# Patient Record
Sex: MALE | Race: WHITE | Employment: UNEMPLOYED | URBAN - METROPOLITAN AREA
[De-identification: names, ages, dates, MRNs, and addresses within clinical notes are randomized per-mention and may not be internally consistent; named-entity substitution may affect disease eponyms.]

---

## 2017-01-01 ENCOUNTER — TELEPHONE (OUTPATIENT)
Dept: FAMILY MEDICINE CLINIC | Age: 77
End: 2017-01-01

## 2017-01-01 ENCOUNTER — HOSPITAL ENCOUNTER (OUTPATIENT)
Dept: CT IMAGING | Facility: MEDICAL CENTER | Age: 77
Discharge: HOME | End: 2017-12-08
Attending: INTERNAL MEDICINE | Admitting: INTERNAL MEDICINE

## 2017-01-01 ENCOUNTER — OFFICE VISIT (OUTPATIENT)
Dept: PULMONOLOGY | Age: 77
End: 2017-01-01

## 2017-01-01 VITALS
DIASTOLIC BLOOD PRESSURE: 72 MMHG | HEIGHT: 69 IN | WEIGHT: 163.6 LBS | HEART RATE: 80 BPM | SYSTOLIC BLOOD PRESSURE: 116 MMHG | TEMPERATURE: 97 F | BODY MASS INDEX: 24.23 KG/M2 | RESPIRATION RATE: 20 BRPM | OXYGEN SATURATION: 93 %

## 2017-01-01 DIAGNOSIS — R91.1 SOLITARY PULMONARY NODULE: ICD-10-CM

## 2017-01-01 DIAGNOSIS — J98.4 CAVITATING MASS OF UPPER LOBE OF LEFT LUNG: Primary | ICD-10-CM

## 2017-01-01 DIAGNOSIS — R91.1 PULMONARY NODULE: ICD-10-CM

## 2017-01-01 DIAGNOSIS — R05.3 CHRONIC COUGH: ICD-10-CM

## 2017-01-01 PROCEDURE — 1123F ACP DISCUSS/DSCN MKR DOCD: CPT | Performed by: INTERNAL MEDICINE

## 2017-01-01 PROCEDURE — G8427 DOCREV CUR MEDS BY ELIG CLIN: HCPCS | Performed by: INTERNAL MEDICINE

## 2017-01-01 PROCEDURE — 1111F DSCHRG MED/CURRENT MED MERGE: CPT | Performed by: INTERNAL MEDICINE

## 2017-01-01 PROCEDURE — 99214 OFFICE O/P EST MOD 30 MIN: CPT | Performed by: INTERNAL MEDICINE

## 2017-01-01 PROCEDURE — G8420 CALC BMI NORM PARAMETERS: HCPCS | Performed by: INTERNAL MEDICINE

## 2017-01-01 PROCEDURE — 4040F PNEUMOC VAC/ADMIN/RCVD: CPT | Performed by: INTERNAL MEDICINE

## 2017-01-01 PROCEDURE — G8484 FLU IMMUNIZE NO ADMIN: HCPCS | Performed by: INTERNAL MEDICINE

## 2017-01-01 PROCEDURE — 4004F PT TOBACCO SCREEN RCVD TLK: CPT | Performed by: INTERNAL MEDICINE

## 2017-01-01 RX ORDER — MIRTAZAPINE 7.5 MG/1
7.5 TABLET, FILM COATED ORAL NIGHTLY
Qty: 30 TABLET | Refills: 3 | Status: SHIPPED | OUTPATIENT
Start: 2017-01-01 | End: 2018-01-01 | Stop reason: SDUPTHER

## 2017-01-01 RX ORDER — GLIMEPIRIDE 2 MG/1
TABLET ORAL
Qty: 30 TABLET | Refills: 1 | Status: SHIPPED | OUTPATIENT
Start: 2017-01-01 | End: 2018-01-01 | Stop reason: SDUPTHER

## 2017-02-24 ENCOUNTER — HOSPITAL ENCOUNTER (OUTPATIENT)
Dept: CT IMAGING | Age: 77
Discharge: HOME | End: 2017-02-24
Attending: INTERNAL MEDICINE | Admitting: INTERNAL MEDICINE

## 2017-02-24 DIAGNOSIS — C34.11 MALIGNANT NEOPLASM OF UPPER LOBE OF RIGHT LUNG (HCC): ICD-10-CM

## 2017-02-24 DIAGNOSIS — C34.11 MALIGNANT NEOPLASM OF UPPER LOBE, RIGHT BRONCHUS OR LUNG (HCC): ICD-10-CM

## 2017-02-24 LAB
A/G RATIO: 1.7 (ref 1.1–2.2)
ALBUMIN SERPL-MCNC: 4.4 G/DL (ref 3.4–5)
ALP BLD-CCNC: 83 U/L (ref 40–129)
ALT SERPL-CCNC: 19 U/L (ref 10–40)
ANION GAP SERPL CALCULATED.3IONS-SCNC: 6 MMOL/L (ref 3–16)
AST SERPL-CCNC: 21 U/L (ref 15–37)
BILIRUB SERPL-MCNC: <0.2 MG/DL (ref 0–1)
BUN BLDV-MCNC: 21 MG/DL (ref 7–20)
CALCIUM SERPL-MCNC: 10.4 MG/DL (ref 8.3–10.6)
CHLORIDE BLD-SCNC: 103 MMOL/L (ref 99–110)
CO2: 32 MMOL/L (ref 21–32)
CREAT SERPL-MCNC: 0.8 MG/DL (ref 0.8–1.3)
GFR AFRICAN AMERICAN: >60
GFR NON-AFRICAN AMERICAN: >60
GLOBULIN: 2.6 G/DL
GLUCOSE BLD-MCNC: 100 MG/DL (ref 70–99)
POTASSIUM SERPL-SCNC: 5.1 MMOL/L (ref 3.5–5.1)
SODIUM BLD-SCNC: 141 MMOL/L (ref 136–145)
TOTAL PROTEIN: 7 G/DL (ref 6.4–8.2)

## 2017-03-24 ENCOUNTER — TELEPHONE (OUTPATIENT)
Dept: FAMILY MEDICINE CLINIC | Age: 77
End: 2017-03-24

## 2017-03-27 RX ORDER — RANITIDINE 150 MG/1
TABLET ORAL
Qty: 60 TABLET | Refills: 5 | Status: SHIPPED | OUTPATIENT
Start: 2017-03-27 | End: 2017-05-31

## 2017-05-12 ENCOUNTER — OFFICE VISIT (OUTPATIENT)
Dept: FAMILY MEDICINE CLINIC | Age: 77
End: 2017-05-12

## 2017-05-12 ENCOUNTER — TELEPHONE (OUTPATIENT)
Dept: FAMILY MEDICINE CLINIC | Age: 77
End: 2017-05-12

## 2017-05-12 VITALS
BODY MASS INDEX: 24.96 KG/M2 | WEIGHT: 169 LBS | SYSTOLIC BLOOD PRESSURE: 120 MMHG | DIASTOLIC BLOOD PRESSURE: 70 MMHG | HEART RATE: 89 BPM | TEMPERATURE: 97.7 F | OXYGEN SATURATION: 96 %

## 2017-05-12 DIAGNOSIS — C34.00 MALIGNANT NEOPLASM OF HILUS OF LUNG, UNSPECIFIED LATERALITY (HCC): ICD-10-CM

## 2017-05-12 DIAGNOSIS — I10 ESSENTIAL HYPERTENSION: ICD-10-CM

## 2017-05-12 DIAGNOSIS — R53.83 FATIGUE, UNSPECIFIED TYPE: ICD-10-CM

## 2017-05-12 DIAGNOSIS — J43.1 PANLOBULAR EMPHYSEMA (HCC): Primary | ICD-10-CM

## 2017-05-12 LAB
BASOPHILS ABSOLUTE: 0 K/UL (ref 0–0.2)
BASOPHILS RELATIVE PERCENT: 0.9 %
EOSINOPHILS ABSOLUTE: 0.2 K/UL (ref 0–0.6)
EOSINOPHILS RELATIVE PERCENT: 3.8 %
HCT VFR BLD CALC: 46.2 % (ref 40.5–52.5)
HEMOGLOBIN: 15.1 G/DL (ref 13.5–17.5)
LYMPHOCYTES ABSOLUTE: 1 K/UL (ref 1–5.1)
LYMPHOCYTES RELATIVE PERCENT: 19.1 %
MCH RBC QN AUTO: 32.2 PG (ref 26–34)
MCHC RBC AUTO-ENTMCNC: 32.7 G/DL (ref 31–36)
MCV RBC AUTO: 98.6 FL (ref 80–100)
MONOCYTES ABSOLUTE: 0.6 K/UL (ref 0–1.3)
MONOCYTES RELATIVE PERCENT: 11.7 %
NEUTROPHILS ABSOLUTE: 3.4 K/UL (ref 1.7–7.7)
NEUTROPHILS RELATIVE PERCENT: 64.5 %
PDW BLD-RTO: 14.1 % (ref 12.4–15.4)
PLATELET # BLD: 161 K/UL (ref 135–450)
PMV BLD AUTO: 9.5 FL (ref 5–10.5)
RBC # BLD: 4.68 M/UL (ref 4.2–5.9)
WBC # BLD: 5.3 K/UL (ref 4–11)

## 2017-05-12 PROCEDURE — G8926 SPIRO NO PERF OR DOC: HCPCS | Performed by: FAMILY MEDICINE

## 2017-05-12 PROCEDURE — 4004F PT TOBACCO SCREEN RCVD TLK: CPT | Performed by: FAMILY MEDICINE

## 2017-05-12 PROCEDURE — 36415 COLL VENOUS BLD VENIPUNCTURE: CPT | Performed by: FAMILY MEDICINE

## 2017-05-12 PROCEDURE — G8420 CALC BMI NORM PARAMETERS: HCPCS | Performed by: FAMILY MEDICINE

## 2017-05-12 PROCEDURE — 94640 AIRWAY INHALATION TREATMENT: CPT | Performed by: FAMILY MEDICINE

## 2017-05-12 PROCEDURE — G8427 DOCREV CUR MEDS BY ELIG CLIN: HCPCS | Performed by: FAMILY MEDICINE

## 2017-05-12 PROCEDURE — 1123F ACP DISCUSS/DSCN MKR DOCD: CPT | Performed by: FAMILY MEDICINE

## 2017-05-12 PROCEDURE — 99214 OFFICE O/P EST MOD 30 MIN: CPT | Performed by: FAMILY MEDICINE

## 2017-05-12 PROCEDURE — 4040F PNEUMOC VAC/ADMIN/RCVD: CPT | Performed by: FAMILY MEDICINE

## 2017-05-12 PROCEDURE — 3023F SPIROM DOC REV: CPT | Performed by: FAMILY MEDICINE

## 2017-05-12 RX ORDER — PROMETHAZINE HYDROCHLORIDE AND CODEINE PHOSPHATE 6.25; 1 MG/5ML; MG/5ML
5 SYRUP ORAL NIGHTLY
Qty: 180 ML | Refills: 3 | Status: SHIPPED | OUTPATIENT
Start: 2017-05-12 | End: 2017-07-26

## 2017-05-12 RX ORDER — ALBUTEROL SULFATE 2.5 MG/3ML
2.5 SOLUTION RESPIRATORY (INHALATION) ONCE
Status: COMPLETED | OUTPATIENT
Start: 2017-05-12 | End: 2017-05-12

## 2017-05-12 RX ORDER — PREDNISONE 20 MG/1
40 TABLET ORAL DAILY
Qty: 20 TABLET | Refills: 0 | Status: SHIPPED | OUTPATIENT
Start: 2017-05-12 | End: 2017-05-22

## 2017-05-12 RX ADMIN — ALBUTEROL SULFATE 2.5 MG: 2.5 SOLUTION RESPIRATORY (INHALATION) at 14:47

## 2017-05-13 LAB
ALT SERPL-CCNC: 17 U/L (ref 10–40)
ANION GAP SERPL CALCULATED.3IONS-SCNC: 15 MMOL/L (ref 3–16)
BUN BLDV-MCNC: 24 MG/DL (ref 7–20)
CALCIUM SERPL-MCNC: 9.6 MG/DL (ref 8.3–10.6)
CHLORIDE BLD-SCNC: 100 MMOL/L (ref 99–110)
CO2: 25 MMOL/L (ref 21–32)
CREAT SERPL-MCNC: 0.7 MG/DL (ref 0.8–1.3)
GFR AFRICAN AMERICAN: >60
GFR NON-AFRICAN AMERICAN: >60
GLUCOSE BLD-MCNC: 100 MG/DL (ref 70–99)
POTASSIUM SERPL-SCNC: 4.9 MMOL/L (ref 3.5–5.1)
SODIUM BLD-SCNC: 140 MMOL/L (ref 136–145)
TSH REFLEX: 1.53 UIU/ML (ref 0.27–4.2)
VITAMIN B-12: 797 PG/ML (ref 211–911)

## 2017-05-19 ENCOUNTER — TELEPHONE (OUTPATIENT)
Dept: FAMILY MEDICINE CLINIC | Age: 77
End: 2017-05-19

## 2017-05-22 RX ORDER — DOXYCYCLINE HYCLATE 100 MG
100 TABLET ORAL 2 TIMES DAILY
Qty: 20 TABLET | Refills: 0 | Status: SHIPPED | OUTPATIENT
Start: 2017-05-22 | End: 2017-06-01

## 2017-05-22 RX ORDER — PREDNISONE 10 MG/1
TABLET ORAL
Qty: 56 TABLET | Refills: 0 | Status: SHIPPED | OUTPATIENT
Start: 2017-05-22 | End: 2017-06-01

## 2017-05-22 RX ORDER — AZITHROMYCIN 250 MG/1
TABLET, FILM COATED ORAL
Qty: 6 TABLET | Refills: 0 | Status: SHIPPED | OUTPATIENT
Start: 2017-05-22 | End: 2017-05-31

## 2017-05-27 ENCOUNTER — HOSPITAL ENCOUNTER (OUTPATIENT)
Dept: CT IMAGING | Age: 77
Discharge: HOME | End: 2017-05-27
Attending: INTERNAL MEDICINE | Admitting: INTERNAL MEDICINE

## 2017-05-27 DIAGNOSIS — Z08 ENCOUNTER FOR FOLLOW-UP EXAMINATION AFTER COMBINED TREATMENT FOR MALIGNANT NEOPLASM: ICD-10-CM

## 2017-05-27 DIAGNOSIS — Z85.118 PERSONAL HISTORY OF OTHER MALIGNANT NEOPLASM OF BRONCHUS AND LUNG: ICD-10-CM

## 2017-05-27 DIAGNOSIS — Z85.118 PERSONAL HISTORY OF LUNG CANCER: ICD-10-CM

## 2017-05-31 ENCOUNTER — TELEPHONE (OUTPATIENT)
Dept: PULMONOLOGY | Age: 77
End: 2017-05-31

## 2017-06-01 ENCOUNTER — OFFICE VISIT (OUTPATIENT)
Dept: PULMONOLOGY | Age: 77
End: 2017-06-01

## 2017-06-01 VITALS
RESPIRATION RATE: 18 BRPM | BODY MASS INDEX: 25.8 KG/M2 | HEIGHT: 70 IN | WEIGHT: 180.2 LBS | SYSTOLIC BLOOD PRESSURE: 124 MMHG | DIASTOLIC BLOOD PRESSURE: 70 MMHG | OXYGEN SATURATION: 97 % | HEART RATE: 104 BPM | TEMPERATURE: 98.1 F

## 2017-06-01 DIAGNOSIS — R05.3 CHRONIC COUGH: Primary | ICD-10-CM

## 2017-06-01 DIAGNOSIS — C34.91 ADENOCARCINOMA, LUNG, RIGHT (HCC): ICD-10-CM

## 2017-06-01 PROCEDURE — 1123F ACP DISCUSS/DSCN MKR DOCD: CPT | Performed by: INTERNAL MEDICINE

## 2017-06-01 PROCEDURE — 99214 OFFICE O/P EST MOD 30 MIN: CPT | Performed by: INTERNAL MEDICINE

## 2017-06-01 PROCEDURE — G8427 DOCREV CUR MEDS BY ELIG CLIN: HCPCS | Performed by: INTERNAL MEDICINE

## 2017-06-01 PROCEDURE — 4004F PT TOBACCO SCREEN RCVD TLK: CPT | Performed by: INTERNAL MEDICINE

## 2017-06-01 PROCEDURE — 4040F PNEUMOC VAC/ADMIN/RCVD: CPT | Performed by: INTERNAL MEDICINE

## 2017-06-01 PROCEDURE — G8420 CALC BMI NORM PARAMETERS: HCPCS | Performed by: INTERNAL MEDICINE

## 2017-06-01 RX ORDER — FLUTICASONE PROPIONATE 50 MCG
SPRAY, SUSPENSION (ML) NASAL
Qty: 1 BOTTLE | Refills: 3 | Status: SHIPPED | OUTPATIENT
Start: 2017-06-01 | End: 2017-07-11

## 2017-06-01 RX ORDER — OMEPRAZOLE 40 MG/1
40 CAPSULE, DELAYED RELEASE ORAL DAILY
Qty: 30 CAPSULE | Refills: 3 | Status: SHIPPED | OUTPATIENT
Start: 2017-06-01 | End: 2017-07-11

## 2017-06-01 RX ORDER — RANITIDINE 150 MG/1
150 CAPSULE ORAL 2 TIMES DAILY
COMMUNITY
End: 2017-07-26

## 2017-07-11 ENCOUNTER — OFFICE VISIT (OUTPATIENT)
Dept: PULMONOLOGY | Age: 77
End: 2017-07-11

## 2017-07-11 ENCOUNTER — TELEPHONE (OUTPATIENT)
Dept: PULMONOLOGY | Age: 77
End: 2017-07-11

## 2017-07-11 VITALS
TEMPERATURE: 97.6 F | SYSTOLIC BLOOD PRESSURE: 124 MMHG | WEIGHT: 175.8 LBS | RESPIRATION RATE: 20 BRPM | BODY MASS INDEX: 26.04 KG/M2 | DIASTOLIC BLOOD PRESSURE: 80 MMHG | HEART RATE: 101 BPM | OXYGEN SATURATION: 97 % | HEIGHT: 69 IN

## 2017-07-11 DIAGNOSIS — R05.3 CHRONIC COUGH: Primary | ICD-10-CM

## 2017-07-11 DIAGNOSIS — R93.89 ABNORMAL CT SCAN, CHEST: ICD-10-CM

## 2017-07-11 PROCEDURE — 99214 OFFICE O/P EST MOD 30 MIN: CPT | Performed by: INTERNAL MEDICINE

## 2017-07-11 PROCEDURE — 4004F PT TOBACCO SCREEN RCVD TLK: CPT | Performed by: INTERNAL MEDICINE

## 2017-07-11 PROCEDURE — 1123F ACP DISCUSS/DSCN MKR DOCD: CPT | Performed by: INTERNAL MEDICINE

## 2017-07-11 PROCEDURE — 4040F PNEUMOC VAC/ADMIN/RCVD: CPT | Performed by: INTERNAL MEDICINE

## 2017-07-11 PROCEDURE — G8427 DOCREV CUR MEDS BY ELIG CLIN: HCPCS | Performed by: INTERNAL MEDICINE

## 2017-07-11 PROCEDURE — G8419 CALC BMI OUT NRM PARAM NOF/U: HCPCS | Performed by: INTERNAL MEDICINE

## 2017-07-11 ASSESSMENT — SLEEP AND FATIGUE QUESTIONNAIRES
HOW LIKELY ARE YOU TO NOD OFF OR FALL ASLEEP WHILE SITTING AND READING: 2
HOW LIKELY ARE YOU TO NOD OFF OR FALL ASLEEP IN A CAR, WHILE STOPPED FOR A FEW MINUTES IN TRAFFIC: 0
HOW LIKELY ARE YOU TO NOD OFF OR FALL ASLEEP WHILE SITTING QUIETLY AFTER LUNCH WITHOUT ALCOHOL: 0
HOW LIKELY ARE YOU TO NOD OFF OR FALL ASLEEP WHILE SITTING INACTIVE IN A PUBLIC PLACE: 2
NECK CIRCUMFERENCE (INCHES): 15.5
HOW LIKELY ARE YOU TO NOD OFF OR FALL ASLEEP WHEN YOU ARE A PASSENGER IN A CAR FOR AN HOUR WITHOUT A BREAK: 2
HOW LIKELY ARE YOU TO NOD OFF OR FALL ASLEEP WHILE SITTING AND TALKING TO SOMEONE: 0
HOW LIKELY ARE YOU TO NOD OFF OR FALL ASLEEP WHILE LYING DOWN TO REST IN THE AFTERNOON WHEN CIRCUMSTANCES PERMIT: 2
HOW LIKELY ARE YOU TO NOD OFF OR FALL ASLEEP WHILE WATCHING TV: 2
ESS TOTAL SCORE: 10

## 2017-07-13 ENCOUNTER — HOSPITAL ENCOUNTER (OUTPATIENT)
Dept: OTHER | Age: 77
Discharge: HOME | End: 2017-07-13
Attending: INTERNAL MEDICINE | Admitting: INTERNAL MEDICINE

## 2017-07-13 VITALS
DIASTOLIC BLOOD PRESSURE: 73 MMHG | BODY MASS INDEX: 25.18 KG/M2 | RESPIRATION RATE: 16 BRPM | SYSTOLIC BLOOD PRESSURE: 107 MMHG | WEIGHT: 170 LBS | HEART RATE: 84 BPM | OXYGEN SATURATION: 94 % | HEIGHT: 69 IN | TEMPERATURE: 97.6 F

## 2017-07-13 LAB
APPEARANCE BAL (LAVAGE): CLEAR
CLOT EVALUATION BAL: ABNORMAL
COLOR LAVAGE: COLORLESS
EOSIN: 2 %
LYMPHOCYTES, BAL: 8 % (ref 5–10)
MACROPHAGES, BAL: 2 % (ref 90–95)
NUMBER OF CELLS COUNTED BAL (LAVAGE): 100
RBC, BAL: 1110 /CUMM
SEGMENTED NEUTROPHILS, BAL: 88 % (ref 5–10)
WBC/EPI CELLS BAL: 232 /CUMM

## 2017-07-13 PROCEDURE — 31624 DX BRONCHOSCOPE/LAVAGE: CPT | Performed by: INTERNAL MEDICINE

## 2017-07-13 PROCEDURE — 31623 DX BRONCHOSCOPE/BRUSH: CPT | Performed by: INTERNAL MEDICINE

## 2017-07-13 RX ORDER — ALBUTEROL SULFATE 2.5 MG/3ML
2.5 SOLUTION RESPIRATORY (INHALATION) EVERY 6 HOURS PRN
Status: COMPLETED | OUTPATIENT
Start: 2017-07-13 | End: 2017-07-13

## 2017-07-13 RX ADMIN — ALBUTEROL SULFATE 2.5 MG: 2.5 SOLUTION RESPIRATORY (INHALATION) at 11:25

## 2017-07-13 ASSESSMENT — PAIN SCALES - GENERAL
PAINLEVEL_OUTOF10: 0

## 2017-07-13 ASSESSMENT — PAIN - FUNCTIONAL ASSESSMENT: PAIN_FUNCTIONAL_ASSESSMENT: 0-10

## 2017-07-15 LAB
CULTURE, RESPIRATORY: NORMAL
CULTURE, RESPIRATORY: NORMAL
GRAM STAIN RESULT: NORMAL
GRAM STAIN RESULT: NORMAL

## 2017-07-17 RX ORDER — CEFDINIR 300 MG/1
300 CAPSULE ORAL 2 TIMES DAILY
Qty: 20 CAPSULE | Refills: 0 | Status: SHIPPED | OUTPATIENT
Start: 2017-07-17 | End: 2017-07-26

## 2017-07-19 ENCOUNTER — OFFICE VISIT (OUTPATIENT)
Dept: PULMONOLOGY | Age: 77
End: 2017-07-19

## 2017-07-19 VITALS
WEIGHT: 177.8 LBS | SYSTOLIC BLOOD PRESSURE: 133 MMHG | TEMPERATURE: 97.6 F | HEART RATE: 85 BPM | RESPIRATION RATE: 16 BRPM | OXYGEN SATURATION: 95 % | DIASTOLIC BLOOD PRESSURE: 83 MMHG | BODY MASS INDEX: 26.33 KG/M2 | HEIGHT: 69 IN

## 2017-07-19 DIAGNOSIS — R05.3 CHRONIC COUGH: Primary | ICD-10-CM

## 2017-07-19 DIAGNOSIS — C34.91 NON-SMALL CELL CANCER OF RIGHT LUNG (HCC): ICD-10-CM

## 2017-07-19 PROCEDURE — 4040F PNEUMOC VAC/ADMIN/RCVD: CPT | Performed by: INTERNAL MEDICINE

## 2017-07-19 PROCEDURE — G8427 DOCREV CUR MEDS BY ELIG CLIN: HCPCS | Performed by: INTERNAL MEDICINE

## 2017-07-19 PROCEDURE — G8419 CALC BMI OUT NRM PARAM NOF/U: HCPCS | Performed by: INTERNAL MEDICINE

## 2017-07-19 PROCEDURE — 1123F ACP DISCUSS/DSCN MKR DOCD: CPT | Performed by: INTERNAL MEDICINE

## 2017-07-19 PROCEDURE — 99214 OFFICE O/P EST MOD 30 MIN: CPT | Performed by: INTERNAL MEDICINE

## 2017-07-19 PROCEDURE — 4004F PT TOBACCO SCREEN RCVD TLK: CPT | Performed by: INTERNAL MEDICINE

## 2017-07-19 RX ORDER — BENZONATATE 100 MG/1
100 CAPSULE ORAL 3 TIMES DAILY PRN
Qty: 90 CAPSULE | Refills: 1 | Status: SHIPPED | OUTPATIENT
Start: 2017-07-19 | End: 2017-07-26

## 2017-07-26 ENCOUNTER — OFFICE VISIT (OUTPATIENT)
Dept: FAMILY MEDICINE CLINIC | Age: 77
End: 2017-07-26

## 2017-07-26 VITALS
OXYGEN SATURATION: 98 % | WEIGHT: 176.4 LBS | HEART RATE: 77 BPM | TEMPERATURE: 97.8 F | BODY MASS INDEX: 26.05 KG/M2 | DIASTOLIC BLOOD PRESSURE: 78 MMHG | SYSTOLIC BLOOD PRESSURE: 120 MMHG

## 2017-07-26 DIAGNOSIS — F32.0 MAJOR DEPRESSIVE DISORDER, SINGLE EPISODE, MILD (HCC): ICD-10-CM

## 2017-07-26 DIAGNOSIS — R31.9 HEMATURIA: ICD-10-CM

## 2017-07-26 DIAGNOSIS — R41.82 ALTERED MENTAL STATUS, UNSPECIFIED ALTERED MENTAL STATUS TYPE: ICD-10-CM

## 2017-07-26 DIAGNOSIS — F03.90 DEMENTIA WITHOUT BEHAVIORAL DISTURBANCE, UNSPECIFIED DEMENTIA TYPE: ICD-10-CM

## 2017-07-26 DIAGNOSIS — R53.83 FATIGUE, UNSPECIFIED TYPE: ICD-10-CM

## 2017-07-26 DIAGNOSIS — C34.00 MALIGNANT NEOPLASM OF HILUS OF LUNG, UNSPECIFIED LATERALITY (HCC): Primary | ICD-10-CM

## 2017-07-26 DIAGNOSIS — R05.3 CHRONIC COUGH: ICD-10-CM

## 2017-07-26 LAB
ALT SERPL-CCNC: 25 U/L (ref 10–40)
ANION GAP SERPL CALCULATED.3IONS-SCNC: 14 MMOL/L (ref 3–16)
AST SERPL-CCNC: 21 U/L (ref 15–37)
BASOPHILS ABSOLUTE: 0 K/UL (ref 0–0.2)
BASOPHILS RELATIVE PERCENT: 1.1 %
BILIRUBIN, POC: ABNORMAL
BLOOD URINE, POC: ABNORMAL
BUN BLDV-MCNC: 20 MG/DL (ref 7–20)
CALCIUM SERPL-MCNC: 9.3 MG/DL (ref 8.3–10.6)
CHLORIDE BLD-SCNC: 100 MMOL/L (ref 99–110)
CLARITY, POC: CLEAR
CO2: 25 MMOL/L (ref 21–32)
COLOR, POC: ABNORMAL
CREAT SERPL-MCNC: 0.8 MG/DL (ref 0.8–1.3)
EOSINOPHILS ABSOLUTE: 0.2 K/UL (ref 0–0.6)
EOSINOPHILS RELATIVE PERCENT: 5.4 %
FOLATE: >20 NG/ML (ref 4.78–24.2)
GFR AFRICAN AMERICAN: >60
GFR NON-AFRICAN AMERICAN: >60
GLUCOSE BLD-MCNC: 88 MG/DL (ref 70–99)
GLUCOSE URINE, POC: ABNORMAL
HCT VFR BLD CALC: 45.8 % (ref 40.5–52.5)
HEMOGLOBIN: 15.6 G/DL (ref 13.5–17.5)
KETONES, POC: ABNORMAL
LEUKOCYTE EST, POC: ABNORMAL
LYMPHOCYTES ABSOLUTE: 0.8 K/UL (ref 1–5.1)
LYMPHOCYTES RELATIVE PERCENT: 16.9 %
MCH RBC QN AUTO: 33.6 PG (ref 26–34)
MCHC RBC AUTO-ENTMCNC: 34 G/DL (ref 31–36)
MCV RBC AUTO: 98.8 FL (ref 80–100)
MONOCYTES ABSOLUTE: 0.5 K/UL (ref 0–1.3)
MONOCYTES RELATIVE PERCENT: 10.6 %
NEUTROPHILS ABSOLUTE: 3 K/UL (ref 1.7–7.7)
NEUTROPHILS RELATIVE PERCENT: 66 %
NITRITE, POC: ABNORMAL
PDW BLD-RTO: 13.8 % (ref 12.4–15.4)
PH, POC: 5
PLATELET # BLD: 159 K/UL (ref 135–450)
PMV BLD AUTO: 9.3 FL (ref 5–10.5)
POTASSIUM SERPL-SCNC: 4.5 MMOL/L (ref 3.5–5.1)
PROTEIN, POC: ABNORMAL
RBC # BLD: 4.64 M/UL (ref 4.2–5.9)
SODIUM BLD-SCNC: 139 MMOL/L (ref 136–145)
SPECIFIC GRAVITY, POC: 1.02
TSH REFLEX: 1.21 UIU/ML (ref 0.27–4.2)
UROBILINOGEN, POC: 0.2
VITAMIN B-12: 769 PG/ML (ref 211–911)
WBC # BLD: 4.5 K/UL (ref 4–11)

## 2017-07-26 PROCEDURE — 36415 COLL VENOUS BLD VENIPUNCTURE: CPT | Performed by: FAMILY MEDICINE

## 2017-07-26 PROCEDURE — 81003 URINALYSIS AUTO W/O SCOPE: CPT | Performed by: FAMILY MEDICINE

## 2017-07-26 PROCEDURE — G8419 CALC BMI OUT NRM PARAM NOF/U: HCPCS | Performed by: FAMILY MEDICINE

## 2017-07-26 PROCEDURE — 1123F ACP DISCUSS/DSCN MKR DOCD: CPT | Performed by: FAMILY MEDICINE

## 2017-07-26 PROCEDURE — 4040F PNEUMOC VAC/ADMIN/RCVD: CPT | Performed by: FAMILY MEDICINE

## 2017-07-26 PROCEDURE — G8427 DOCREV CUR MEDS BY ELIG CLIN: HCPCS | Performed by: FAMILY MEDICINE

## 2017-07-26 PROCEDURE — 4004F PT TOBACCO SCREEN RCVD TLK: CPT | Performed by: FAMILY MEDICINE

## 2017-07-26 PROCEDURE — 99214 OFFICE O/P EST MOD 30 MIN: CPT | Performed by: FAMILY MEDICINE

## 2017-07-27 ENCOUNTER — TELEPHONE (OUTPATIENT)
Dept: FAMILY MEDICINE CLINIC | Age: 77
End: 2017-07-27

## 2017-07-27 RX ORDER — PAROXETINE HYDROCHLORIDE 20 MG/1
20 TABLET, FILM COATED ORAL EVERY MORNING
Qty: 30 TABLET | Refills: 3 | Status: SHIPPED | OUTPATIENT
Start: 2017-07-27 | End: 2017-08-02 | Stop reason: ALTCHOICE

## 2017-07-28 LAB — URINE CULTURE, ROUTINE: NORMAL

## 2017-07-31 ENCOUNTER — HOSPITAL ENCOUNTER (OUTPATIENT)
Dept: CT IMAGING | Age: 77
Discharge: HOME | End: 2017-07-31
Attending: FAMILY MEDICINE | Admitting: FAMILY MEDICINE

## 2017-07-31 DIAGNOSIS — F03.90 DEMENTIA WITHOUT BEHAVIORAL DISTURBANCE, UNSPECIFIED DEMENTIA TYPE: ICD-10-CM

## 2017-07-31 DIAGNOSIS — R53.83 FATIGUE, UNSPECIFIED TYPE: ICD-10-CM

## 2017-07-31 DIAGNOSIS — C34.00 MALIGNANT NEOPLASM OF HILUS OF LUNG, UNSPECIFIED LATERALITY (HCC): ICD-10-CM

## 2017-07-31 DIAGNOSIS — R05.3 CHRONIC COUGH: ICD-10-CM

## 2017-07-31 DIAGNOSIS — C34.00 MALIGNANT NEOPLASM OF MAIN BRONCHUS (HCC): ICD-10-CM

## 2017-07-31 DIAGNOSIS — R41.82 ALTERED MENTAL STATUS, UNSPECIFIED ALTERED MENTAL STATUS TYPE: ICD-10-CM

## 2017-07-31 DIAGNOSIS — F32.0 MAJOR DEPRESSIVE DISORDER, SINGLE EPISODE, MILD (HCC): ICD-10-CM

## 2017-07-31 RX ORDER — DEXAMETHASONE 4 MG/1
4 TABLET ORAL EVERY 6 HOURS
Qty: 40 TABLET | Refills: 0 | Status: SHIPPED | OUTPATIENT
Start: 2017-07-31 | End: 2017-08-08 | Stop reason: DRUGHIGH

## 2017-08-01 ENCOUNTER — HOSPITAL ENCOUNTER (OUTPATIENT)
Dept: MRI IMAGING | Age: 77
Discharge: HOME | End: 2017-08-01
Attending: RADIOLOGY | Admitting: RADIOLOGY

## 2017-08-01 DIAGNOSIS — C79.31 SECONDARY CANCER OF BRAIN (HCC): ICD-10-CM

## 2017-08-01 DIAGNOSIS — C79.31 SECONDARY MALIGNANT NEOPLASM OF BRAIN (HCC): ICD-10-CM

## 2017-08-03 ENCOUNTER — OFFICE VISIT (OUTPATIENT)
Dept: FAMILY MEDICINE CLINIC | Age: 77
End: 2017-08-03

## 2017-08-03 ENCOUNTER — TELEPHONE (OUTPATIENT)
Dept: RADIATION ONCOLOGY | Age: 77
End: 2017-08-03

## 2017-08-03 VITALS
OXYGEN SATURATION: 97 % | DIASTOLIC BLOOD PRESSURE: 68 MMHG | WEIGHT: 180 LBS | BODY MASS INDEX: 26.58 KG/M2 | TEMPERATURE: 97.5 F | SYSTOLIC BLOOD PRESSURE: 110 MMHG | HEART RATE: 72 BPM

## 2017-08-03 DIAGNOSIS — C79.31 SECONDARY ADENOCARCINOMA OF BRAIN (HCC): ICD-10-CM

## 2017-08-03 DIAGNOSIS — F41.9 ANXIETY: ICD-10-CM

## 2017-08-03 DIAGNOSIS — I10 ESSENTIAL HYPERTENSION: ICD-10-CM

## 2017-08-03 DIAGNOSIS — G40.909 SECONDARY SEIZURE DISORDER (HCC): ICD-10-CM

## 2017-08-03 DIAGNOSIS — C34.00 MALIGNANT NEOPLASM OF HILUS OF LUNG, UNSPECIFIED LATERALITY (HCC): Primary | ICD-10-CM

## 2017-08-03 PROCEDURE — G8427 DOCREV CUR MEDS BY ELIG CLIN: HCPCS | Performed by: FAMILY MEDICINE

## 2017-08-03 PROCEDURE — 99214 OFFICE O/P EST MOD 30 MIN: CPT | Performed by: FAMILY MEDICINE

## 2017-08-03 PROCEDURE — 4040F PNEUMOC VAC/ADMIN/RCVD: CPT | Performed by: FAMILY MEDICINE

## 2017-08-03 PROCEDURE — G8419 CALC BMI OUT NRM PARAM NOF/U: HCPCS | Performed by: FAMILY MEDICINE

## 2017-08-03 PROCEDURE — 4004F PT TOBACCO SCREEN RCVD TLK: CPT | Performed by: FAMILY MEDICINE

## 2017-08-03 PROCEDURE — 1123F ACP DISCUSS/DSCN MKR DOCD: CPT | Performed by: FAMILY MEDICINE

## 2017-08-03 RX ORDER — CITALOPRAM 10 MG/1
10 TABLET ORAL DAILY
Qty: 30 TABLET | Refills: 3 | Status: SHIPPED | OUTPATIENT
Start: 2017-08-03 | End: 2017-09-19 | Stop reason: SDUPTHER

## 2017-08-04 ENCOUNTER — TELEPHONE (OUTPATIENT)
Dept: RADIATION ONCOLOGY | Age: 77
End: 2017-08-04

## 2017-08-07 ENCOUNTER — TELEPHONE (OUTPATIENT)
Dept: RADIATION ONCOLOGY | Age: 77
End: 2017-08-07

## 2017-08-08 ENCOUNTER — HOSPITAL ENCOUNTER (OUTPATIENT)
Dept: RADIATION ONCOLOGY | Age: 77
Discharge: HOME | End: 2017-08-08
Attending: NEUROLOGICAL SURGERY | Admitting: NEUROLOGICAL SURGERY

## 2017-08-08 VITALS
TEMPERATURE: 98.5 F | DIASTOLIC BLOOD PRESSURE: 67 MMHG | WEIGHT: 170 LBS | RESPIRATION RATE: 16 BRPM | BODY MASS INDEX: 25.18 KG/M2 | OXYGEN SATURATION: 96 % | HEIGHT: 69 IN | SYSTOLIC BLOOD PRESSURE: 134 MMHG | HEART RATE: 51 BPM

## 2017-08-08 DIAGNOSIS — C79.31 SECONDARY MALIGNANT NEOPLASM OF BRAIN AND SPINAL CORD (HCC): ICD-10-CM

## 2017-08-08 DIAGNOSIS — C79.31 SECONDARY MALIGNANT NEOPLASM OF BRAIN (HCC): ICD-10-CM

## 2017-08-08 DIAGNOSIS — C79.49 SECONDARY MALIGNANT NEOPLASM OF BRAIN AND SPINAL CORD (HCC): ICD-10-CM

## 2017-08-08 RX ORDER — ONDANSETRON 2 MG/ML
4 INJECTION INTRAMUSCULAR; INTRAVENOUS ONCE
Status: COMPLETED | OUTPATIENT
Start: 2017-08-08 | End: 2017-08-08

## 2017-08-08 RX ORDER — 0.9 % SODIUM CHLORIDE 0.9 %
500 INTRAVENOUS SOLUTION INTRAVENOUS ONCE
Status: COMPLETED | OUTPATIENT
Start: 2017-08-08 | End: 2017-08-08

## 2017-08-08 RX ORDER — HEPARIN SODIUM (PORCINE) LOCK FLUSH IV SOLN 100 UNIT/ML 100 UNIT/ML
500 SOLUTION INTRAVENOUS PRN
Status: DISCONTINUED | OUTPATIENT
Start: 2017-08-08 | End: 2017-08-09 | Stop reason: HOSPADM

## 2017-08-08 RX ORDER — LIDOCAINE HYDROCHLORIDE 10 MG/ML
30 INJECTION, SOLUTION EPIDURAL; INFILTRATION; INTRACAUDAL; PERINEURAL ONCE
Status: COMPLETED | OUTPATIENT
Start: 2017-08-08 | End: 2017-08-08

## 2017-08-08 RX ORDER — BUPIVACAINE HYDROCHLORIDE 5 MG/ML
30 INJECTION, SOLUTION EPIDURAL; INTRACAUDAL ONCE
Status: COMPLETED | OUTPATIENT
Start: 2017-08-08 | End: 2017-08-08

## 2017-08-08 RX ORDER — DEXAMETHASONE SODIUM PHOSPHATE 10 MG/ML
10 INJECTION, SOLUTION INTRAMUSCULAR; INTRAVENOUS ONCE
Status: DISCONTINUED | OUTPATIENT
Start: 2017-08-08 | End: 2017-08-09 | Stop reason: HOSPADM

## 2017-08-08 RX ADMIN — Medication 500 ML: at 06:38

## 2017-08-08 RX ADMIN — ONDANSETRON 4 MG: 2 INJECTION INTRAMUSCULAR; INTRAVENOUS at 07:17

## 2017-08-08 RX ADMIN — BUPIVACAINE HYDROCHLORIDE 150 MG: 5 INJECTION, SOLUTION EPIDURAL; INTRACAUDAL at 07:55

## 2017-08-08 RX ADMIN — HEPARIN SODIUM (PORCINE) LOCK FLUSH IV SOLN 100 UNIT/ML 500 UNITS: 100 SOLUTION at 14:30

## 2017-08-08 RX ADMIN — LIDOCAINE HYDROCHLORIDE 30 ML: 10 INJECTION, SOLUTION EPIDURAL; INFILTRATION; INTRACAUDAL; PERINEURAL at 07:55

## 2017-08-08 ASSESSMENT — PAIN SCALES - GENERAL: PAINLEVEL_OUTOF10: 10

## 2017-08-09 ENCOUNTER — TELEPHONE (OUTPATIENT)
Dept: RADIATION ONCOLOGY | Age: 77
End: 2017-08-09

## 2017-08-11 DIAGNOSIS — C79.31 BRAIN METASTASES (HCC): Primary | ICD-10-CM

## 2017-08-14 LAB
FUNGUS (MYCOLOGY) CULTURE: NORMAL
FUNGUS STAIN: NORMAL

## 2017-08-22 ENCOUNTER — HOSPITAL ENCOUNTER (OUTPATIENT)
Dept: CT IMAGING | Age: 77
Discharge: HOME | End: 2017-08-22
Attending: RADIOLOGY | Admitting: RADIOLOGY

## 2017-08-22 ENCOUNTER — CLINICAL DOCUMENTATION (OUTPATIENT)
Dept: RADIATION ONCOLOGY | Age: 77
End: 2017-08-22

## 2017-08-22 DIAGNOSIS — C79.31 SECONDARY MALIGNANT NEOPLASM OF BRAIN (HCC): ICD-10-CM

## 2017-08-22 DIAGNOSIS — C79.31 BRAIN METASTASIS (HCC): ICD-10-CM

## 2017-08-24 ENCOUNTER — HOSPITAL ENCOUNTER (OUTPATIENT)
Dept: OTHER | Age: 77
Discharge: HOME | End: 2017-08-24
Attending: INTERNAL MEDICINE | Admitting: INTERNAL MEDICINE

## 2017-08-24 VITALS — WEIGHT: 170 LBS | BODY MASS INDEX: 25.18 KG/M2 | HEIGHT: 69 IN

## 2017-08-24 DIAGNOSIS — C34.31 MALIGNANT NEOPLASM OF LOWER LOBE OF RIGHT LUNG (HCC): ICD-10-CM

## 2017-08-24 RX ORDER — FLUDEOXYGLUCOSE F 18 200 MCI/ML
17.3 INJECTION, SOLUTION INTRAVENOUS
Status: COMPLETED | OUTPATIENT
Start: 2017-08-24 | End: 2017-08-24

## 2017-08-24 RX ADMIN — FLUDEOXYGLUCOSE F 18 17.3 MILLICURIE: 200 INJECTION, SOLUTION INTRAVENOUS at 10:35

## 2017-08-29 LAB
AFB CULTURE (MYCOBACTERIA): NORMAL
AFB CULTURE (MYCOBACTERIA): NORMAL
AFB SMEAR: NORMAL
AFB SMEAR: NORMAL

## 2017-08-30 LAB
FUNGUS (MYCOLOGY) CULTURE: ABNORMAL
FUNGUS STAIN: ABNORMAL
ORGANISM: ABNORMAL

## 2017-08-31 ENCOUNTER — TELEPHONE (OUTPATIENT)
Dept: FAMILY MEDICINE CLINIC | Age: 77
End: 2017-08-31

## 2017-08-31 DIAGNOSIS — R13.19 OTHER DYSPHAGIA: Primary | ICD-10-CM

## 2017-09-01 ENCOUNTER — TELEPHONE (OUTPATIENT)
Dept: RADIATION ONCOLOGY | Age: 77
End: 2017-09-01

## 2017-09-13 ENCOUNTER — OFFICE VISIT (OUTPATIENT)
Dept: FAMILY MEDICINE CLINIC | Age: 77
End: 2017-09-13

## 2017-09-13 DIAGNOSIS — C34.91 NON-SMALL CELL LUNG CANCER, RIGHT (HCC): ICD-10-CM

## 2017-09-13 DIAGNOSIS — R06.02 SHORTNESS OF BREATH: Primary | ICD-10-CM

## 2017-09-13 DIAGNOSIS — R00.0 TACHYCARDIA: ICD-10-CM

## 2017-09-13 DIAGNOSIS — R53.83 FATIGUE, UNSPECIFIED TYPE: ICD-10-CM

## 2017-09-13 DIAGNOSIS — R60.0 BILATERAL EDEMA OF LOWER EXTREMITY: ICD-10-CM

## 2017-09-13 DIAGNOSIS — C79.31 SECONDARY ADENOCARCINOMA OF BRAIN (HCC): ICD-10-CM

## 2017-09-13 DIAGNOSIS — R41.0 DELIRIUM: ICD-10-CM

## 2017-09-13 PROCEDURE — 4004F PT TOBACCO SCREEN RCVD TLK: CPT | Performed by: FAMILY MEDICINE

## 2017-09-13 PROCEDURE — 1123F ACP DISCUSS/DSCN MKR DOCD: CPT | Performed by: FAMILY MEDICINE

## 2017-09-13 PROCEDURE — G8417 CALC BMI ABV UP PARAM F/U: HCPCS | Performed by: FAMILY MEDICINE

## 2017-09-13 PROCEDURE — 4040F PNEUMOC VAC/ADMIN/RCVD: CPT | Performed by: FAMILY MEDICINE

## 2017-09-13 PROCEDURE — 99214 OFFICE O/P EST MOD 30 MIN: CPT | Performed by: FAMILY MEDICINE

## 2017-09-13 PROCEDURE — G8427 DOCREV CUR MEDS BY ELIG CLIN: HCPCS | Performed by: FAMILY MEDICINE

## 2017-09-13 RX ORDER — ALPRAZOLAM 0.5 MG/1
0.5 TABLET ORAL NIGHTLY PRN
Qty: 30 TABLET | Refills: 5 | Status: ON HOLD | OUTPATIENT
Start: 2017-09-13 | End: 2017-09-14 | Stop reason: ALTCHOICE

## 2017-09-14 PROBLEM — J32.0 MAXILLARY SINUSITIS, CHRONIC: Status: ACTIVE | Noted: 2017-09-14

## 2017-09-14 PROBLEM — E44.0 MODERATE PROTEIN-CALORIE MALNUTRITION (HCC): Status: ACTIVE | Noted: 2017-09-14

## 2017-09-14 PROBLEM — R06.02 SOB (SHORTNESS OF BREATH): Status: ACTIVE | Noted: 2017-09-14

## 2017-09-14 PROBLEM — J32.2 CHRONIC ETHMOIDAL SINUSITIS: Status: ACTIVE | Noted: 2017-09-14

## 2017-09-14 PROBLEM — F17.200 CURRENT SMOKER: Status: ACTIVE | Noted: 2017-09-14

## 2017-09-14 PROBLEM — I51.89 DIASTOLIC DYSFUNCTION: Status: ACTIVE | Noted: 2017-09-14

## 2017-09-19 ENCOUNTER — OFFICE VISIT (OUTPATIENT)
Dept: FAMILY MEDICINE CLINIC | Age: 77
End: 2017-09-19

## 2017-09-19 VITALS
OXYGEN SATURATION: 92 % | DIASTOLIC BLOOD PRESSURE: 78 MMHG | WEIGHT: 175.4 LBS | BODY MASS INDEX: 25.9 KG/M2 | SYSTOLIC BLOOD PRESSURE: 132 MMHG | HEART RATE: 110 BPM

## 2017-09-19 DIAGNOSIS — E46 PROTEIN DEFICIENCY (HCC): ICD-10-CM

## 2017-09-19 DIAGNOSIS — J01.90 ACUTE BACTERIAL SINUSITIS: ICD-10-CM

## 2017-09-19 DIAGNOSIS — R26.81 UNSTEADY GAIT: ICD-10-CM

## 2017-09-19 DIAGNOSIS — B96.89 ACUTE BACTERIAL SINUSITIS: ICD-10-CM

## 2017-09-19 DIAGNOSIS — R60.0 BILATERAL EDEMA OF LOWER EXTREMITY: Primary | ICD-10-CM

## 2017-09-19 DIAGNOSIS — J44.1 COPD EXACERBATION (HCC): ICD-10-CM

## 2017-09-19 PROCEDURE — 4004F PT TOBACCO SCREEN RCVD TLK: CPT | Performed by: FAMILY MEDICINE

## 2017-09-19 PROCEDURE — 4040F PNEUMOC VAC/ADMIN/RCVD: CPT | Performed by: FAMILY MEDICINE

## 2017-09-19 PROCEDURE — 1111F DSCHRG MED/CURRENT MED MERGE: CPT | Performed by: FAMILY MEDICINE

## 2017-09-19 PROCEDURE — 99214 OFFICE O/P EST MOD 30 MIN: CPT | Performed by: FAMILY MEDICINE

## 2017-09-19 PROCEDURE — 3023F SPIROM DOC REV: CPT | Performed by: FAMILY MEDICINE

## 2017-09-19 PROCEDURE — G8417 CALC BMI ABV UP PARAM F/U: HCPCS | Performed by: FAMILY MEDICINE

## 2017-09-19 PROCEDURE — G8926 SPIRO NO PERF OR DOC: HCPCS | Performed by: FAMILY MEDICINE

## 2017-09-19 PROCEDURE — G8427 DOCREV CUR MEDS BY ELIG CLIN: HCPCS | Performed by: FAMILY MEDICINE

## 2017-09-19 PROCEDURE — 1123F ACP DISCUSS/DSCN MKR DOCD: CPT | Performed by: FAMILY MEDICINE

## 2017-09-19 RX ORDER — CITALOPRAM 10 MG/1
10 TABLET ORAL DAILY
Qty: 30 TABLET | Refills: 3 | Status: SHIPPED | OUTPATIENT
Start: 2017-09-19 | End: 2017-11-05 | Stop reason: ALTCHOICE

## 2017-09-19 RX ORDER — AMOXICILLIN AND CLAVULANATE POTASSIUM 875; 125 MG/1; MG/1
1 TABLET, FILM COATED ORAL EVERY 12 HOURS
Qty: 20 TABLET | Refills: 0 | Status: SHIPPED | OUTPATIENT
Start: 2017-09-19 | End: 2017-09-29

## 2017-09-25 ENCOUNTER — TELEPHONE (OUTPATIENT)
Dept: FAMILY MEDICINE CLINIC | Age: 77
End: 2017-09-25

## 2017-09-25 DIAGNOSIS — R60.1 GENERALIZED EDEMA: Primary | ICD-10-CM

## 2017-09-25 DIAGNOSIS — R29.898 WEAKNESS OF LOWER EXTREMITY, UNSPECIFIED LATERALITY: ICD-10-CM

## 2017-10-05 ENCOUNTER — OFFICE VISIT (OUTPATIENT)
Dept: FAMILY MEDICINE CLINIC | Age: 77
End: 2017-10-05

## 2017-10-05 VITALS
TEMPERATURE: 97.4 F | HEART RATE: 87 BPM | SYSTOLIC BLOOD PRESSURE: 120 MMHG | OXYGEN SATURATION: 97 % | DIASTOLIC BLOOD PRESSURE: 80 MMHG | WEIGHT: 181.2 LBS | BODY MASS INDEX: 26.76 KG/M2

## 2017-10-05 DIAGNOSIS — C34.00 MALIGNANT NEOPLASM OF HILUS OF LUNG, UNSPECIFIED LATERALITY (HCC): Primary | ICD-10-CM

## 2017-10-05 DIAGNOSIS — C79.31 SECONDARY ADENOCARCINOMA OF BRAIN (HCC): ICD-10-CM

## 2017-10-05 DIAGNOSIS — J32.2 CHRONIC ETHMOIDAL SINUSITIS: ICD-10-CM

## 2017-10-05 DIAGNOSIS — I10 ESSENTIAL HYPERTENSION: ICD-10-CM

## 2017-10-05 DIAGNOSIS — E44.0 MODERATE PROTEIN-CALORIE MALNUTRITION (HCC): ICD-10-CM

## 2017-10-05 DIAGNOSIS — J32.0 MAXILLARY SINUSITIS, CHRONIC: ICD-10-CM

## 2017-10-05 PROCEDURE — 4004F PT TOBACCO SCREEN RCVD TLK: CPT | Performed by: FAMILY MEDICINE

## 2017-10-05 PROCEDURE — 1111F DSCHRG MED/CURRENT MED MERGE: CPT | Performed by: FAMILY MEDICINE

## 2017-10-05 PROCEDURE — G8427 DOCREV CUR MEDS BY ELIG CLIN: HCPCS | Performed by: FAMILY MEDICINE

## 2017-10-05 PROCEDURE — G8484 FLU IMMUNIZE NO ADMIN: HCPCS | Performed by: FAMILY MEDICINE

## 2017-10-05 PROCEDURE — 4040F PNEUMOC VAC/ADMIN/RCVD: CPT | Performed by: FAMILY MEDICINE

## 2017-10-05 PROCEDURE — G0008 ADMIN INFLUENZA VIRUS VAC: HCPCS | Performed by: FAMILY MEDICINE

## 2017-10-05 PROCEDURE — 1123F ACP DISCUSS/DSCN MKR DOCD: CPT | Performed by: FAMILY MEDICINE

## 2017-10-05 PROCEDURE — G8417 CALC BMI ABV UP PARAM F/U: HCPCS | Performed by: FAMILY MEDICINE

## 2017-10-05 PROCEDURE — 99214 OFFICE O/P EST MOD 30 MIN: CPT | Performed by: FAMILY MEDICINE

## 2017-10-05 PROCEDURE — 90688 IIV4 VACCINE SPLT 0.5 ML IM: CPT | Performed by: FAMILY MEDICINE

## 2017-10-05 NOTE — MR AVS SNAPSHOT
Your BMI as listed above is considered overweight (25.0-29.9). BMI is an estimate of body fat, calculated from your height and weight. The higher your BMI, the greater your risk of heart disease, high blood pressure, type 2 diabetes, stroke, gallstones, arthritis, sleep apnea, and certain cancers. BMI is not perfect. It may overestimate body fat in athletes and people who are more muscular. If your body fat is high you can improve your BMI by decreasing your calorie intake and becoming more physically active. Learn more at: [email or web address]          Instructions     Please read the healthy family handout that you were given and share it with your family. Please compare this printed medication list with your medications at home to be sure they are the same. If you have any medications that are different please contact us immediately at [phone]. Also review your allergies that we have listed, these may also include medications that you have not been able to tolerate, make sure everything listed is correct. If you have any allergies that are different please contact us immediately at [phone]. Medications and Orders      Your Current Medications Are              dexamethasone (DECADRON) 4 MG tablet Take 1 tablet by mouth 4 times daily (after meals and at bedtime) for 10 days    HYDROcodone-acetaminophen (NORCO) 5-325 MG per tablet Take 1 tablet by mouth every 6 hours as needed for Pain .     citalopram (CELEXA) 10 MG tablet Take 1 tablet by mouth daily    omeprazole (PRILOSEC) 40 MG delayed release capsule Take 1 capsule by mouth daily    levETIRAcetam (KEPPRA) 500 MG tablet Take 500 mg by mouth 2 times daily    Compression Stockings MISC by Does not apply route DX: Edema R60.1 lower extremity weakness R29.898    CARAFATE 1 GM/10ML suspension Take 10 mLs by mouth every 6 hours as needed Miles Mixture with lidocaine 1-2 tsp s/swallow q4-6h don't eat x 15 min after.  Keflex 2 g/Nystatin 4 mil units/Hydrocortisone 40 mg/QS Lidocaine 2% w/ flavor (pt pref) 80mL/QS Water      Allergies              Beta Adrenergic Blockers     Demerol     Simvastatin     Muscle aches      We Ordered/Performed the following           INFLUENZA, QUADV, 3 YRS AND OLDER, IM, MDV, 0.5ML (Kelli Perez)          Additional Information        Basic Information     Date Of Birth Sex Race Ethnicity Preferred Language    1940 Male White Non-/Non  English      Problem List as of 10/5/2017  Date Reviewed: 9/25/2017                SOB (shortness of breath)    Current smoker    Moderate protein-calorie malnutrition (HCC)    Chronic ethmoidal sinusitis    Maxillary sinusitis, chronic    Diastolic dysfunction    Secondary seizure disorder ([address]    Secondary adenocarcinoma of brain ([address]    Chronic cough    Non-small cell lung cancer ([address]    Malignant neoplasm of upper lobe of right lung ([address]    Encounter for antineoplastic chemotherapy    Cancer of upper lobe of right lung ([address]    Difficult intravenous access    Malignant neoplasm of lung ([address]    Panlobular emphysema ([address]    Essential hypertension    Lumbar back pain    Renal artery aneurysm ([address]    PUD (peptic ulcer disease)    Malignant neoplasm of lower lobe of right lung (HCC)    Neuropathy ([address]    Hyperlipemia    Anxiety    Obstructive sleep apnea syndrome    OA (osteoarthritis)      Immunizations as of 10/5/2017     Name Date    Influenza Virus Vaccine 11/4/2015, 10/22/2014, 12/27/2013, 10/1/2012    Influenza, America Marques, 3 Years and older, IM 10/5/2017    Pneumococcal 13-valent Conjugate ([id number]) 11/4/2015    Pneumococcal Polysaccharide ([id number]) 11/17/2010      Preventive Care        Date Due    Tetanus Combination Vaccine (1 - Tdap) 7/12/1959    Cholesterol Screening 11/4/2020            [facility] Signup Our records indicate that you have an active [employer] account. You can view your After Visit Summary by going to [email or web address] and logging in with your [employer] username and password. If you don't have a [employer] username and password but a parent or guardian has access to your record, the parent or guardian should login with their own [employer] username and password and access your record to view the After Visit Summary. Additional Information  If you have questions, please contact the physician practice where you receive care. Remember, [employer] is NOT to be used for urgent needs. For medical emergencies, dial 911. For questions regarding your [employer] account call [phone]. If you have a clinical question, please call your doctor's office.

## 2017-10-10 ENCOUNTER — HOSPITAL ENCOUNTER (OUTPATIENT)
Dept: MRI IMAGING | Age: 77
Discharge: HOME | End: 2017-10-10
Attending: RADIOLOGY | Admitting: RADIOLOGY

## 2017-10-10 DIAGNOSIS — C79.31 BRAIN METASTASES (HCC): ICD-10-CM

## 2017-10-10 DIAGNOSIS — C79.31 SECONDARY MALIGNANT NEOPLASM OF BRAIN (HCC): ICD-10-CM

## 2017-10-31 ENCOUNTER — TELEPHONE (OUTPATIENT)
Dept: FAMILY MEDICINE CLINIC | Age: 77
End: 2017-10-31

## 2017-10-31 RX ORDER — CEFUROXIME AXETIL 500 MG/1
500 TABLET ORAL 2 TIMES DAILY
Qty: 20 TABLET | Refills: 0 | Status: ON HOLD | OUTPATIENT
Start: 2017-10-31 | End: 2017-11-08 | Stop reason: HOSPADM

## 2017-11-10 ENCOUNTER — TELEPHONE (OUTPATIENT)
Dept: FAMILY MEDICINE CLINIC | Age: 77
End: 2017-11-10

## 2017-11-10 RX ORDER — LEVOFLOXACIN 750 MG/1
750 TABLET ORAL DAILY
Qty: 12 TABLET | Refills: 0 | Status: SHIPPED | OUTPATIENT
Start: 2017-11-10 | End: 2018-01-01 | Stop reason: SDUPTHER

## 2017-11-10 RX ORDER — IPRATROPIUM BROMIDE AND ALBUTEROL SULFATE 2.5; .5 MG/3ML; MG/3ML
1 SOLUTION RESPIRATORY (INHALATION) EVERY 6 HOURS PRN
Qty: 120 VIAL | Refills: 0 | Status: SHIPPED | OUTPATIENT
Start: 2017-11-10 | End: 2017-11-13 | Stop reason: SDUPTHER

## 2017-11-10 RX ORDER — DIPHENHYDRAMINE HYDROCHLORIDE 25 MG/1
1 CAPSULE, LIQUID FILLED ORAL DAILY
Qty: 1 KIT | Refills: 0 | Status: SHIPPED | OUTPATIENT
Start: 2017-11-10 | End: 2017-11-16 | Stop reason: SDUPTHER

## 2017-11-10 RX ORDER — LANCETS 30 GAUGE
EACH MISCELLANEOUS
Qty: 100 EACH | Refills: 3 | Status: SHIPPED | OUTPATIENT
Start: 2017-11-10 | End: 2018-01-01 | Stop reason: ALTCHOICE

## 2017-11-10 RX ORDER — GLUCOSAMINE HCL/CHONDROITIN SU 500-400 MG
CAPSULE ORAL
Qty: 100 STRIP | Refills: 3 | Status: SHIPPED | OUTPATIENT
Start: 2017-11-10 | End: 2017-11-16 | Stop reason: SDUPTHER

## 2017-11-10 NOTE — TELEPHONE ENCOUNTER
Terri Olea for home blood sugar supplies. Chart shows that levaquin and walter were sent to [facility] by Dr Kraig Nichole.

## 2017-11-10 NOTE — TELEPHONE ENCOUNTER
The nurse  called and the patient  came home from the hospital and was told to be on levaquin and a duo-neb for his nebulizer machine but has no order. Can he get these ordered  and sent to [facility]?    The nurse said it should be levaquin 750mg 1 daily for 12 days and then the Duo-neb solution  He has green drainage and SOB, please advise rc

## 2017-11-13 RX ORDER — IPRATROPIUM BROMIDE AND ALBUTEROL SULFATE 2.5; .5 MG/3ML; MG/3ML
SOLUTION RESPIRATORY (INHALATION)
Qty: 360 ML | Refills: 0 | Status: SHIPPED | OUTPATIENT
Start: 2017-11-13 | End: 2018-01-01 | Stop reason: ALTCHOICE

## 2017-11-15 ENCOUNTER — OFFICE VISIT (OUTPATIENT)
Dept: FAMILY MEDICINE CLINIC | Age: 77
End: 2017-11-15

## 2017-11-15 VITALS
OXYGEN SATURATION: 95 % | HEART RATE: 157 BPM | TEMPERATURE: 97.8 F | DIASTOLIC BLOOD PRESSURE: 64 MMHG | SYSTOLIC BLOOD PRESSURE: 90 MMHG

## 2017-11-15 DIAGNOSIS — T50.905A DRUG-INDUCED HYPERGLYCEMIA: ICD-10-CM

## 2017-11-15 DIAGNOSIS — R73.9 DRUG-INDUCED HYPERGLYCEMIA: ICD-10-CM

## 2017-11-15 DIAGNOSIS — E44.0 MODERATE PROTEIN-CALORIE MALNUTRITION (HCC): ICD-10-CM

## 2017-11-15 DIAGNOSIS — C34.11 CANCER OF UPPER LOBE OF RIGHT LUNG (HCC): ICD-10-CM

## 2017-11-15 DIAGNOSIS — F41.9 ANXIETY: ICD-10-CM

## 2017-11-15 DIAGNOSIS — R35.0 URINE FREQUENCY: Primary | ICD-10-CM

## 2017-11-15 DIAGNOSIS — J15.1 PNEUMONIA OF RIGHT LOWER LOBE DUE TO PSEUDOMONAS SPECIES (HCC): ICD-10-CM

## 2017-11-15 DIAGNOSIS — I51.89 DIASTOLIC DYSFUNCTION: ICD-10-CM

## 2017-11-15 LAB
BILIRUBIN, POC: ABNORMAL
BLOOD URINE, POC: ABNORMAL
CLARITY, POC: CLEAR
COLOR, POC: ABNORMAL
GLUCOSE BLD-MCNC: 87 MG/DL
GLUCOSE URINE, POC: ABNORMAL
KETONES, POC: ABNORMAL
LEUKOCYTE EST, POC: ABNORMAL
NITRITE, POC: ABNORMAL
PH, POC: 7
PROTEIN, POC: ABNORMAL
SPECIFIC GRAVITY, POC: 0.01
UROBILINOGEN, POC: 0.2

## 2017-11-15 PROCEDURE — 82962 GLUCOSE BLOOD TEST: CPT | Performed by: FAMILY MEDICINE

## 2017-11-15 PROCEDURE — 4004F PT TOBACCO SCREEN RCVD TLK: CPT | Performed by: FAMILY MEDICINE

## 2017-11-15 PROCEDURE — G8484 FLU IMMUNIZE NO ADMIN: HCPCS | Performed by: FAMILY MEDICINE

## 2017-11-15 PROCEDURE — 99214 OFFICE O/P EST MOD 30 MIN: CPT | Performed by: FAMILY MEDICINE

## 2017-11-15 PROCEDURE — G8417 CALC BMI ABV UP PARAM F/U: HCPCS | Performed by: FAMILY MEDICINE

## 2017-11-15 PROCEDURE — G8427 DOCREV CUR MEDS BY ELIG CLIN: HCPCS | Performed by: FAMILY MEDICINE

## 2017-11-15 PROCEDURE — 1111F DSCHRG MED/CURRENT MED MERGE: CPT | Performed by: FAMILY MEDICINE

## 2017-11-15 PROCEDURE — 4040F PNEUMOC VAC/ADMIN/RCVD: CPT | Performed by: FAMILY MEDICINE

## 2017-11-15 PROCEDURE — 1123F ACP DISCUSS/DSCN MKR DOCD: CPT | Performed by: FAMILY MEDICINE

## 2017-11-15 PROCEDURE — 81003 URINALYSIS AUTO W/O SCOPE: CPT | Performed by: FAMILY MEDICINE

## 2017-11-15 RX ORDER — LANCETS 30 GAUGE
EACH MISCELLANEOUS
Qty: 100 EACH | Refills: 5 | Status: SHIPPED | OUTPATIENT
Start: 2017-11-15 | End: 2017-11-16 | Stop reason: SDUPTHER

## 2017-11-15 RX ORDER — LORAZEPAM 0.5 MG/1
0.5 TABLET ORAL EVERY 8 HOURS PRN
Qty: 60 TABLET | Refills: 2 | Status: SHIPPED | OUTPATIENT
Start: 2017-11-15 | End: 2018-01-01 | Stop reason: ALTCHOICE

## 2017-11-15 RX ORDER — GLUCOSAMINE HCL/CHONDROITIN SU 500-400 MG
CAPSULE ORAL
Qty: 60 STRIP | Refills: 5 | Status: SHIPPED | OUTPATIENT
Start: 2017-11-15 | End: 2017-11-16 | Stop reason: SDUPTHER

## 2017-11-15 NOTE — PROGRESS NOTES
Faxed RX for Ativan [facility]
culture  Ativan as needed  Home blood sugar monitoring  Consider titrating off Amaryl then metformin based on blood sugar readings. He will call with these readings every 2 days  He will begin a multivitamin  Continue to work with other physicians  Offered to follow-up in one month. He states he will wait for 2 months  Continue other medicines  \"Healthy Family Handout\" provided  Avoid exposure to all tobacco products. Read and consider all information provided by the pharmacy regarding prescribed medications before use  Careful medical compliance  Proper diet and weight management   Otherwise continue current treatment plan  Call or return if symptoms are not well controlled  Go to ED if severe/significant symptoms occur    All medical conditions for this patient are stable unless otherwise indicated    Ciera Donald MD    This note was transcribed using a voice recognition software system. Proper technique and careful oversight were used to increase transcription accuracy but inadvertent errors may be present.

## 2017-11-16 ENCOUNTER — TELEPHONE (OUTPATIENT)
Dept: FAMILY MEDICINE CLINIC | Age: 77
End: 2017-11-16

## 2017-11-16 RX ORDER — LANCETS 30 GAUGE
EACH MISCELLANEOUS
Qty: 100 EACH | Refills: 5 | Status: SHIPPED | OUTPATIENT
Start: 2017-11-16 | End: 2018-01-01 | Stop reason: ALTCHOICE

## 2017-11-16 RX ORDER — GLUCOSAMINE HCL/CHONDROITIN SU 500-400 MG
CAPSULE ORAL
Qty: 60 STRIP | Refills: 5 | Status: SHIPPED | OUTPATIENT
Start: 2017-11-16 | End: 2018-01-01 | Stop reason: ALTCHOICE

## 2017-11-16 NOTE — TELEPHONE ENCOUNTER
Medicare will not pay for testing supplies for drug induced hyperglycemia. Spoke to [facility] their brand monitor is 18.99, test strips for 100 count 22.99, and lancets 100 count for $7. Wife states they will pay out of pocket for this.

## 2017-11-17 LAB — URINE CULTURE, ROUTINE: NORMAL

## 2017-11-29 NOTE — TELEPHONE ENCOUNTER
Wife states patient is having loss of appetite.   He is drinking ensure but wanted to see if there is any medication you could prescribe to help

## 2018-01-01 ENCOUNTER — HOSPITAL ENCOUNTER (OUTPATIENT)
Dept: CT IMAGING | Facility: MEDICAL CENTER | Age: 78
Discharge: HOME | End: 2018-03-19
Attending: FAMILY MEDICINE | Admitting: FAMILY MEDICINE

## 2018-01-01 ENCOUNTER — TELEPHONE (OUTPATIENT)
Dept: FAMILY MEDICINE CLINIC | Age: 78
End: 2018-01-01

## 2018-01-01 ENCOUNTER — OFFICE VISIT (OUTPATIENT)
Dept: FAMILY MEDICINE CLINIC | Age: 78
End: 2018-01-01

## 2018-01-01 ENCOUNTER — TELEPHONE (OUTPATIENT)
Dept: SURGERY | Age: 78
End: 2018-01-01

## 2018-01-01 ENCOUNTER — HOSPITAL ENCOUNTER (OUTPATIENT)
Dept: MRI IMAGING | Age: 78
Discharge: HOME | End: 2018-07-18
Payer: MEDICARE

## 2018-01-01 ENCOUNTER — HOSPITAL ENCOUNTER (OUTPATIENT)
Dept: PET IMAGING | Age: 78
Discharge: HOME | End: 2018-10-04
Payer: MEDICARE

## 2018-01-01 ENCOUNTER — ANESTHESIA (OUTPATIENT)
Dept: ENDOSCOPY | Age: 78
End: 2018-01-01
Payer: MEDICARE

## 2018-01-01 ENCOUNTER — HOSPITAL ENCOUNTER (EMERGENCY)
Age: 78
End: 2018-11-19
Attending: EMERGENCY MEDICINE
Payer: MEDICARE

## 2018-01-01 ENCOUNTER — HOSPITAL ENCOUNTER (OUTPATIENT)
Dept: CT IMAGING | Facility: MEDICAL CENTER | Age: 78
Discharge: HOME | End: 2018-06-18
Attending: FAMILY MEDICINE | Admitting: INTERNAL MEDICINE

## 2018-01-01 ENCOUNTER — ANESTHESIA EVENT (OUTPATIENT)
Dept: ENDOSCOPY | Age: 78
End: 2018-01-01
Payer: MEDICARE

## 2018-01-01 ENCOUNTER — HOSPITAL ENCOUNTER (OUTPATIENT)
Dept: MRI IMAGING | Age: 78
Discharge: HOME | End: 2018-04-24
Attending: RADIOLOGY | Admitting: RADIOLOGY

## 2018-01-01 ENCOUNTER — OFFICE VISIT (OUTPATIENT)
Dept: SURGERY | Age: 78
End: 2018-01-01
Payer: MEDICARE

## 2018-01-01 ENCOUNTER — HOSPITAL ENCOUNTER (OUTPATIENT)
Dept: MRI IMAGING | Age: 78
Discharge: HOME | End: 2018-01-23
Attending: RADIOLOGY | Admitting: RADIOLOGY

## 2018-01-01 ENCOUNTER — HOSPITAL ENCOUNTER (OUTPATIENT)
Dept: NURSING | Age: 78
Setting detail: INFUSION SERIES
Discharge: HOME | End: 2018-08-09
Payer: MEDICARE

## 2018-01-01 ENCOUNTER — HOSPITAL ENCOUNTER (OUTPATIENT)
Age: 78
Setting detail: OUTPATIENT SURGERY
Discharge: HOME | End: 2018-09-26
Attending: INTERNAL MEDICINE | Admitting: INTERNAL MEDICINE
Payer: MEDICARE

## 2018-01-01 ENCOUNTER — HOSPITAL ENCOUNTER (OUTPATIENT)
Age: 78
Discharge: HOME | End: 2018-09-12
Payer: MEDICARE

## 2018-01-01 ENCOUNTER — HOSPITAL ENCOUNTER (OUTPATIENT)
Dept: CT IMAGING | Age: 78
Discharge: HOME | End: 2018-09-12
Payer: MEDICARE

## 2018-01-01 VITALS
SYSTOLIC BLOOD PRESSURE: 118 MMHG | WEIGHT: 145 LBS | OXYGEN SATURATION: 99 % | BODY MASS INDEX: 21.41 KG/M2 | DIASTOLIC BLOOD PRESSURE: 70 MMHG | HEART RATE: 103 BPM

## 2018-01-01 VITALS
TEMPERATURE: 98.9 F | WEIGHT: 151 LBS | HEART RATE: 76 BPM | HEIGHT: 69 IN | BODY MASS INDEX: 22.36 KG/M2 | OXYGEN SATURATION: 96 %

## 2018-01-01 VITALS
HEART RATE: 89 BPM | OXYGEN SATURATION: 98 % | WEIGHT: 148 LBS | TEMPERATURE: 97.2 F | BODY MASS INDEX: 21.92 KG/M2 | RESPIRATION RATE: 16 BRPM | SYSTOLIC BLOOD PRESSURE: 161 MMHG | HEIGHT: 69 IN | DIASTOLIC BLOOD PRESSURE: 67 MMHG

## 2018-01-01 VITALS
SYSTOLIC BLOOD PRESSURE: 128 MMHG | TEMPERATURE: 97.4 F | WEIGHT: 176.2 LBS | HEART RATE: 87 BPM | BODY MASS INDEX: 26.02 KG/M2 | OXYGEN SATURATION: 99 % | DIASTOLIC BLOOD PRESSURE: 70 MMHG

## 2018-01-01 VITALS
BODY MASS INDEX: 21.48 KG/M2 | HEART RATE: 84 BPM | DIASTOLIC BLOOD PRESSURE: 71 MMHG | SYSTOLIC BLOOD PRESSURE: 126 MMHG | WEIGHT: 145 LBS | HEIGHT: 69 IN | RESPIRATION RATE: 16 BRPM

## 2018-01-01 VITALS — DIASTOLIC BLOOD PRESSURE: 65 MMHG | OXYGEN SATURATION: 96 % | SYSTOLIC BLOOD PRESSURE: 107 MMHG

## 2018-01-01 VITALS — HEIGHT: 69 IN | BODY MASS INDEX: 21.48 KG/M2 | WEIGHT: 145 LBS

## 2018-01-01 VITALS
HEART RATE: 130 BPM | SYSTOLIC BLOOD PRESSURE: 150 MMHG | OXYGEN SATURATION: 97 % | BODY MASS INDEX: 22.68 KG/M2 | DIASTOLIC BLOOD PRESSURE: 87 MMHG | WEIGHT: 153.6 LBS

## 2018-01-01 VITALS
DIASTOLIC BLOOD PRESSURE: 68 MMHG | WEIGHT: 139 LBS | HEART RATE: 102 BPM | OXYGEN SATURATION: 100 % | BODY MASS INDEX: 20.53 KG/M2 | SYSTOLIC BLOOD PRESSURE: 120 MMHG

## 2018-01-01 DIAGNOSIS — I46.9 CARDIAC ARREST (HCC): Primary | ICD-10-CM

## 2018-01-01 DIAGNOSIS — K59.00 CONSTIPATION, UNSPECIFIED CONSTIPATION TYPE: ICD-10-CM

## 2018-01-01 DIAGNOSIS — M15.9 PRIMARY OSTEOARTHRITIS INVOLVING MULTIPLE JOINTS: ICD-10-CM

## 2018-01-01 DIAGNOSIS — C79.31 SECONDARY MALIGNANT NEOPLASM OF BRAIN AND SPINAL CORD (HCC): ICD-10-CM

## 2018-01-01 DIAGNOSIS — R19.7 DIARRHEA, UNSPECIFIED TYPE: Primary | ICD-10-CM

## 2018-01-01 DIAGNOSIS — C79.49 SECONDARY MALIGNANT NEOPLASM OF BRAIN AND SPINAL CORD (HCC): ICD-10-CM

## 2018-01-01 DIAGNOSIS — C34.11 MALIGNANT NEOPLASM OF RIGHT UPPER LOBE OF LUNG (HCC): ICD-10-CM

## 2018-01-01 DIAGNOSIS — C79.31 SECONDARY ADENOCARCINOMA OF BRAIN (HCC): ICD-10-CM

## 2018-01-01 DIAGNOSIS — C34.00 MALIGNANT NEOPLASM OF HILUS OF LUNG, UNSPECIFIED LATERALITY (HCC): Primary | ICD-10-CM

## 2018-01-01 DIAGNOSIS — C34.11 CANCER OF BRONCHUS OF RIGHT UPPER LOBE (HCC): ICD-10-CM

## 2018-01-01 DIAGNOSIS — C34.30 MALIGNANT NEOPLASM OF LOWER LOBE OF LUNG, UNSPECIFIED LATERALITY (HCC): ICD-10-CM

## 2018-01-01 DIAGNOSIS — R91.8 LUNG MASS: ICD-10-CM

## 2018-01-01 DIAGNOSIS — J96.00 ACUTE RESPIRATORY FAILURE, UNSPECIFIED WHETHER WITH HYPOXIA OR HYPERCAPNIA (HCC): ICD-10-CM

## 2018-01-01 DIAGNOSIS — E78.00 PURE HYPERCHOLESTEROLEMIA: ICD-10-CM

## 2018-01-01 DIAGNOSIS — C34.90 ADENOCARCINOMA OF LUNG, UNSPECIFIED LATERALITY (HCC): ICD-10-CM

## 2018-01-01 DIAGNOSIS — E44.0 MODERATE PROTEIN-CALORIE MALNUTRITION (HCC): ICD-10-CM

## 2018-01-01 DIAGNOSIS — C25.0 MALIGNANT NEOPLASM OF HEAD OF PANCREAS (HCC): Primary | ICD-10-CM

## 2018-01-01 DIAGNOSIS — C79.31 SECONDARY MALIGNANT NEOPLASM OF BRAIN (HCC): ICD-10-CM

## 2018-01-01 DIAGNOSIS — C7A.090 MALIGNANT CARCINOID TUMOR OF LUNG (HCC): ICD-10-CM

## 2018-01-01 DIAGNOSIS — T50.905A DRUG-INDUCED HYPERGLYCEMIA: ICD-10-CM

## 2018-01-01 DIAGNOSIS — R63.0 ANOREXIA: ICD-10-CM

## 2018-01-01 DIAGNOSIS — J43.1 PANLOBULAR EMPHYSEMA (HCC): ICD-10-CM

## 2018-01-01 DIAGNOSIS — R53.83 FATIGUE, UNSPECIFIED TYPE: ICD-10-CM

## 2018-01-01 DIAGNOSIS — I10 ESSENTIAL HYPERTENSION: ICD-10-CM

## 2018-01-01 DIAGNOSIS — R73.9 DRUG-INDUCED HYPERGLYCEMIA: ICD-10-CM

## 2018-01-01 DIAGNOSIS — C34.10 MALIGNANT NEOPLASM OF UPPER LOBE OF LUNG, UNSPECIFIED LATERALITY (HCC): ICD-10-CM

## 2018-01-01 DIAGNOSIS — R63.0 APPETITE LOSS: ICD-10-CM

## 2018-01-01 DIAGNOSIS — F41.9 ANXIETY: ICD-10-CM

## 2018-01-01 DIAGNOSIS — R05.3 CHRONIC COUGH: Primary | ICD-10-CM

## 2018-01-01 DIAGNOSIS — Z91.81 AT HIGH RISK FOR FALLS: ICD-10-CM

## 2018-01-01 DIAGNOSIS — G47.33 OBSTRUCTIVE SLEEP APNEA SYNDROME: ICD-10-CM

## 2018-01-01 DIAGNOSIS — D64.9 ANEMIA, UNSPECIFIED TYPE: Primary | ICD-10-CM

## 2018-01-01 DIAGNOSIS — F17.200 SMOKER: ICD-10-CM

## 2018-01-01 DIAGNOSIS — C34.11 MALIGNANT NEOPLASM OF UPPER LOBE, RIGHT BRONCHUS OR LUNG (HCC): ICD-10-CM

## 2018-01-01 DIAGNOSIS — C34.31 MALIGNANT NEOPLASM OF LOWER LOBE OF RIGHT LUNG (HCC): ICD-10-CM

## 2018-01-01 LAB
A/G RATIO: 1.3 (ref 1.1–2.2)
ALBUMIN SERPL-MCNC: 4.3 G/DL (ref 3.4–5)
ALP BLD-CCNC: 65 U/L (ref 40–129)
ALT SERPL-CCNC: 17 U/L (ref 10–40)
ALT SERPL-CCNC: 18 U/L (ref 10–40)
ANION GAP SERPL CALCULATED.3IONS-SCNC: 12 MMOL/L (ref 3–16)
ANION GAP SERPL CALCULATED.3IONS-SCNC: 13 MMOL/L (ref 3–16)
ANION GAP SERPL CALCULATED.3IONS-SCNC: 15 MMOL/L (ref 3–16)
AST SERPL-CCNC: 20 U/L (ref 15–37)
BASOPHILS ABSOLUTE: 0 K/UL (ref 0–0.2)
BASOPHILS ABSOLUTE: 0.1 K/UL (ref 0–0.2)
BASOPHILS ABSOLUTE: 0.1 K/UL (ref 0–0.2)
BASOPHILS RELATIVE PERCENT: 0.7 %
BASOPHILS RELATIVE PERCENT: 0.8 %
BASOPHILS RELATIVE PERCENT: 1 %
BILIRUB SERPL-MCNC: <0.2 MG/DL (ref 0–1)
BUN BLDV-MCNC: 14 MG/DL (ref 7–20)
BUN BLDV-MCNC: 16 MG/DL (ref 7–20)
BUN BLDV-MCNC: 18 MG/DL (ref 7–20)
BUN BLDV-MCNC: 23 MG/DL (ref 7–20)
CALCIUM SERPL-MCNC: 10.2 MG/DL (ref 8.3–10.6)
CALCIUM SERPL-MCNC: 9.7 MG/DL (ref 8.3–10.6)
CALCIUM SERPL-MCNC: 9.9 MG/DL (ref 8.3–10.6)
CHLORIDE BLD-SCNC: 105 MMOL/L (ref 99–110)
CHLORIDE BLD-SCNC: 106 MMOL/L (ref 99–110)
CHLORIDE BLD-SCNC: 108 MMOL/L (ref 99–110)
CO2: 21 MMOL/L (ref 21–32)
CO2: 26 MMOL/L (ref 21–32)
CO2: 26 MMOL/L (ref 21–32)
CREAT SERPL-MCNC: 0.6 MG/DL (ref 0.8–1.3)
CREAT SERPL-MCNC: 0.8 MG/DL (ref 0.8–1.3)
CREAT SERPL-MCNC: 0.8 MG/DL (ref 0.8–1.3)
CREAT SERPL-MCNC: 0.9 MG/DL (ref 0.8–1.3)
EOSINOPHILS ABSOLUTE: 0.1 K/UL (ref 0–0.6)
EOSINOPHILS ABSOLUTE: 0.2 K/UL (ref 0–0.6)
EOSINOPHILS ABSOLUTE: 0.2 K/UL (ref 0–0.6)
EOSINOPHILS RELATIVE PERCENT: 1 %
EOSINOPHILS RELATIVE PERCENT: 2 %
EOSINOPHILS RELATIVE PERCENT: 2.9 %
ESTIMATED AVERAGE GLUCOSE: 131.2 MG/DL
GFR AFRICAN AMERICAN: >60
GFR NON-AFRICAN AMERICAN: >60
GLOBULIN: 3.4 G/DL
GLUCOSE BLD-MCNC: 113 MG/DL (ref 70–99)
GLUCOSE BLD-MCNC: 69 MG/DL (ref 70–99)
GLUCOSE BLD-MCNC: 76 MG/DL (ref 70–99)
HBA1C MFR BLD: 6.2 %
HCT VFR BLD CALC: 37.1 % (ref 40.5–52.5)
HCT VFR BLD CALC: 41.9 % (ref 40.5–52.5)
HCT VFR BLD CALC: 44.9 % (ref 40.5–52.5)
HEMOGLOBIN: 12 G/DL (ref 13.5–17.5)
HEMOGLOBIN: 14.1 G/DL (ref 13.5–17.5)
HEMOGLOBIN: 15.3 G/DL (ref 13.5–17.5)
LYMPHOCYTES ABSOLUTE: 0.9 K/UL (ref 1–5.1)
LYMPHOCYTES ABSOLUTE: 1 K/UL (ref 1–5.1)
LYMPHOCYTES ABSOLUTE: 1.1 K/UL (ref 1–5.1)
LYMPHOCYTES RELATIVE PERCENT: 13 %
LYMPHOCYTES RELATIVE PERCENT: 14.3 %
LYMPHOCYTES RELATIVE PERCENT: 15.1 %
MCH RBC QN AUTO: 29.4 PG (ref 26–34)
MCH RBC QN AUTO: 31.3 PG (ref 26–34)
MCH RBC QN AUTO: 32 PG (ref 26–34)
MCHC RBC AUTO-ENTMCNC: 32.5 G/DL (ref 31–36)
MCHC RBC AUTO-ENTMCNC: 33.7 G/DL (ref 31–36)
MCHC RBC AUTO-ENTMCNC: 34 G/DL (ref 31–36)
MCV RBC AUTO: 90.4 FL (ref 80–100)
MCV RBC AUTO: 92.2 FL (ref 80–100)
MCV RBC AUTO: 95 FL (ref 80–100)
MONOCYTES ABSOLUTE: 0.6 K/UL (ref 0–1.3)
MONOCYTES ABSOLUTE: 0.6 K/UL (ref 0–1.3)
MONOCYTES ABSOLUTE: 0.8 K/UL (ref 0–1.3)
MONOCYTES RELATIVE PERCENT: 10.1 %
MONOCYTES RELATIVE PERCENT: 8 %
MONOCYTES RELATIVE PERCENT: 9.9 %
NEUTROPHILS ABSOLUTE: 4.5 K/UL (ref 1.7–7.7)
NEUTROPHILS ABSOLUTE: 5.5 K/UL (ref 1.7–7.7)
NEUTROPHILS ABSOLUTE: 5.6 K/UL (ref 1.7–7.7)
NEUTROPHILS RELATIVE PERCENT: 71.4 %
NEUTROPHILS RELATIVE PERCENT: 72.8 %
NEUTROPHILS RELATIVE PERCENT: 77 %
PDW BLD-RTO: 16.1 % (ref 12.4–15.4)
PDW BLD-RTO: 16.5 % (ref 12.4–15.4)
PDW BLD-RTO: 16.6 % (ref 12.4–15.4)
PLATELET # BLD: 207 K/UL (ref 135–450)
PLATELET # BLD: 213 K/UL (ref 135–450)
PLATELET # BLD: 230 K/UL (ref 135–450)
PMV BLD AUTO: 8.4 FL (ref 5–10.5)
PMV BLD AUTO: 8.8 FL (ref 5–10.5)
PMV BLD AUTO: 8.8 FL (ref 5–10.5)
POIKILOCYTES: ABNORMAL
POTASSIUM SERPL-SCNC: 4.4 MMOL/L (ref 3.5–5.1)
RBC # BLD: 4.1 M/UL (ref 4.2–5.9)
RBC # BLD: 4.41 M/UL (ref 4.2–5.9)
RBC # BLD: 4.87 M/UL (ref 4.2–5.9)
SLIDE REVIEW: ABNORMAL
SODIUM BLD-SCNC: 142 MMOL/L (ref 136–145)
SODIUM BLD-SCNC: 144 MMOL/L (ref 136–145)
SODIUM BLD-SCNC: 146 MMOL/L (ref 136–145)
TOTAL PROTEIN: 7.7 G/DL (ref 6.4–8.2)
TSH REFLEX: 1.92 UIU/ML (ref 0.27–4.2)
VITAMIN B-12: 478 PG/ML (ref 211–911)
WBC # BLD: 6.3 K/UL (ref 4–11)
WBC # BLD: 7.2 K/UL (ref 4–11)
WBC # BLD: 7.7 K/UL (ref 4–11)

## 2018-01-01 PROCEDURE — 4004F PT TOBACCO SCREEN RCVD TLK: CPT | Performed by: FAMILY MEDICINE

## 2018-01-01 PROCEDURE — 99211 OFF/OP EST MAY X REQ PHY/QHP: CPT

## 2018-01-01 PROCEDURE — 3430000000 HC RX DIAGNOSTIC RADIOPHARMACEUTICAL: Performed by: INTERNAL MEDICINE

## 2018-01-01 PROCEDURE — 7100000011 HC PHASE II RECOVERY - ADDTL 15 MIN: Performed by: INTERNAL MEDICINE

## 2018-01-01 PROCEDURE — 7100000010 HC PHASE II RECOVERY - FIRST 15 MIN: Performed by: INTERNAL MEDICINE

## 2018-01-01 PROCEDURE — 2720000010 HC SURG SUPPLY STERILE: Performed by: INTERNAL MEDICINE

## 2018-01-01 PROCEDURE — 99214 OFFICE O/P EST MOD 30 MIN: CPT | Performed by: FAMILY MEDICINE

## 2018-01-01 PROCEDURE — 4040F PNEUMOC VAC/ADMIN/RCVD: CPT | Performed by: NURSE PRACTITIONER

## 2018-01-01 PROCEDURE — 88305 TISSUE EXAM BY PATHOLOGIST: CPT

## 2018-01-01 PROCEDURE — G8926 SPIRO NO PERF OR DOC: HCPCS | Performed by: FAMILY MEDICINE

## 2018-01-01 PROCEDURE — 99214 OFFICE O/P EST MOD 30 MIN: CPT | Performed by: NURSE PRACTITIONER

## 2018-01-01 PROCEDURE — 1123F ACP DISCUSS/DSCN MKR DOCD: CPT | Performed by: NURSE PRACTITIONER

## 2018-01-01 PROCEDURE — G8427 DOCREV CUR MEDS BY ELIG CLIN: HCPCS | Performed by: SURGERY

## 2018-01-01 PROCEDURE — 4040F PNEUMOC VAC/ADMIN/RCVD: CPT | Performed by: FAMILY MEDICINE

## 2018-01-01 PROCEDURE — 88172 CYTP DX EVAL FNA 1ST EA SITE: CPT

## 2018-01-01 PROCEDURE — G8420 CALC BMI NORM PARAMETERS: HCPCS | Performed by: FAMILY MEDICINE

## 2018-01-01 PROCEDURE — G8417 CALC BMI ABV UP PARAM F/U: HCPCS | Performed by: FAMILY MEDICINE

## 2018-01-01 PROCEDURE — 3700000000 HC ANESTHESIA ATTENDED CARE: Performed by: INTERNAL MEDICINE

## 2018-01-01 PROCEDURE — 6360000002 HC RX W HCPCS: Performed by: EMERGENCY MEDICINE

## 2018-01-01 PROCEDURE — 84520 ASSAY OF UREA NITROGEN: CPT

## 2018-01-01 PROCEDURE — G8420 CALC BMI NORM PARAMETERS: HCPCS | Performed by: NURSE PRACTITIONER

## 2018-01-01 PROCEDURE — 4004F PT TOBACCO SCREEN RCVD TLK: CPT | Performed by: NURSE PRACTITIONER

## 2018-01-01 PROCEDURE — 1123F ACP DISCUSS/DSCN MKR DOCD: CPT | Performed by: FAMILY MEDICINE

## 2018-01-01 PROCEDURE — 88173 CYTOPATH EVAL FNA REPORT: CPT

## 2018-01-01 PROCEDURE — 71260 CT THORAX DX C+: CPT

## 2018-01-01 PROCEDURE — 3609018500 HC EGD US SCOPE W/ADJACENT STRUCTURES: Performed by: INTERNAL MEDICINE

## 2018-01-01 PROCEDURE — 70553 MRI BRAIN STEM W/O & W/DYE: CPT

## 2018-01-01 PROCEDURE — G8484 FLU IMMUNIZE NO ADMIN: HCPCS | Performed by: SURGERY

## 2018-01-01 PROCEDURE — G8427 DOCREV CUR MEDS BY ELIG CLIN: HCPCS | Performed by: NURSE PRACTITIONER

## 2018-01-01 PROCEDURE — 1123F ACP DISCUSS/DSCN MKR DOCD: CPT | Performed by: SURGERY

## 2018-01-01 PROCEDURE — 4040F PNEUMOC VAC/ADMIN/RCVD: CPT | Performed by: SURGERY

## 2018-01-01 PROCEDURE — 6360000002 HC RX W HCPCS: Performed by: NURSE ANESTHETIST, CERTIFIED REGISTERED

## 2018-01-01 PROCEDURE — 99205 OFFICE O/P NEW HI 60 MIN: CPT | Performed by: SURGERY

## 2018-01-01 PROCEDURE — 1101F PT FALLS ASSESS-DOCD LE1/YR: CPT | Performed by: NURSE PRACTITIONER

## 2018-01-01 PROCEDURE — 36415 COLL VENOUS BLD VENIPUNCTURE: CPT | Performed by: FAMILY MEDICINE

## 2018-01-01 PROCEDURE — 74177 CT ABD & PELVIS W/CONTRAST: CPT

## 2018-01-01 PROCEDURE — 3700000001 HC ADD 15 MINUTES (ANESTHESIA): Performed by: INTERNAL MEDICINE

## 2018-01-01 PROCEDURE — G8427 DOCREV CUR MEDS BY ELIG CLIN: HCPCS | Performed by: FAMILY MEDICINE

## 2018-01-01 PROCEDURE — 88177 CYTP FNA EVAL EA ADDL: CPT

## 2018-01-01 PROCEDURE — 96374 THER/PROPH/DIAG INJ IV PUSH: CPT

## 2018-01-01 PROCEDURE — 6360000002 HC RX W HCPCS: Performed by: INTERNAL MEDICINE

## 2018-01-01 PROCEDURE — 3023F SPIROM DOC REV: CPT | Performed by: FAMILY MEDICINE

## 2018-01-01 PROCEDURE — G8484 FLU IMMUNIZE NO ADMIN: HCPCS | Performed by: FAMILY MEDICINE

## 2018-01-01 PROCEDURE — 36415 COLL VENOUS BLD VENIPUNCTURE: CPT

## 2018-01-01 PROCEDURE — A9579 GAD-BASE MR CONTRAST NOS,1ML: HCPCS | Performed by: NURSE PRACTITIONER

## 2018-01-01 PROCEDURE — G8428 CUR MEDS NOT DOCUMENT: HCPCS | Performed by: FAMILY MEDICINE

## 2018-01-01 PROCEDURE — 99213 OFFICE O/P EST LOW 20 MIN: CPT | Performed by: NURSE PRACTITIONER

## 2018-01-01 PROCEDURE — 6360000004 HC RX CONTRAST MEDICATION: Performed by: INTERNAL MEDICINE

## 2018-01-01 PROCEDURE — 99291 CRITICAL CARE FIRST HOUR: CPT

## 2018-01-01 PROCEDURE — 1101F PT FALLS ASSESS-DOCD LE1/YR: CPT | Performed by: SURGERY

## 2018-01-01 PROCEDURE — 2580000003 HC RX 258: Performed by: ANESTHESIOLOGY

## 2018-01-01 PROCEDURE — 2709999900 HC NON-CHARGEABLE SUPPLY: Performed by: INTERNAL MEDICINE

## 2018-01-01 PROCEDURE — 6360000004 HC RX CONTRAST MEDICATION: Performed by: NURSE PRACTITIONER

## 2018-01-01 PROCEDURE — 88313 SPECIAL STAINS GROUP 2: CPT

## 2018-01-01 PROCEDURE — 36415 COLL VENOUS BLD VENIPUNCTURE: CPT | Performed by: NURSE PRACTITIONER

## 2018-01-01 PROCEDURE — A9552 F18 FDG: HCPCS | Performed by: INTERNAL MEDICINE

## 2018-01-01 PROCEDURE — 88342 IMHCHEM/IMCYTCHM 1ST ANTB: CPT

## 2018-01-01 PROCEDURE — 88341 IMHCHEM/IMCYTCHM EA ADD ANTB: CPT

## 2018-01-01 PROCEDURE — 78815 PET IMAGE W/CT SKULL-THIGH: CPT

## 2018-01-01 PROCEDURE — 4004F PT TOBACCO SCREEN RCVD TLK: CPT | Performed by: SURGERY

## 2018-01-01 PROCEDURE — G8420 CALC BMI NORM PARAMETERS: HCPCS | Performed by: SURGERY

## 2018-01-01 PROCEDURE — 82565 ASSAY OF CREATININE: CPT

## 2018-01-01 RX ORDER — MEGESTROL ACETATE 40 MG/ML
200 SUSPENSION ORAL DAILY
Qty: 240 ML | Refills: 3
Start: 2018-01-01 | End: 2018-01-01 | Stop reason: CLARIF

## 2018-01-01 RX ORDER — SODIUM CHLORIDE, SODIUM LACTATE, POTASSIUM CHLORIDE, CALCIUM CHLORIDE 600; 310; 30; 20 MG/100ML; MG/100ML; MG/100ML; MG/100ML
INJECTION, SOLUTION INTRAVENOUS CONTINUOUS
Status: DISCONTINUED | OUTPATIENT
Start: 2018-01-01 | End: 2018-01-01 | Stop reason: HOSPADM

## 2018-01-01 RX ORDER — PROPOFOL 10 MG/ML
INJECTION, EMULSION INTRAVENOUS PRN
Status: DISCONTINUED | OUTPATIENT
Start: 2018-01-01 | End: 2018-01-01 | Stop reason: SDUPTHER

## 2018-01-01 RX ORDER — HYDROMORPHONE HCL 110MG/55ML
0.5 PATIENT CONTROLLED ANALGESIA SYRINGE INTRAVENOUS EVERY 5 MIN PRN
Status: DISCONTINUED | OUTPATIENT
Start: 2018-01-01 | End: 2018-01-01 | Stop reason: HOSPADM

## 2018-01-01 RX ORDER — GLIMEPIRIDE 2 MG/1
TABLET ORAL
Qty: 30 TABLET | Refills: 3 | Status: SHIPPED | OUTPATIENT
Start: 2018-01-01 | End: 2018-01-01 | Stop reason: ALTCHOICE

## 2018-01-01 RX ORDER — MORPHINE SULFATE 10 MG/ML
2 INJECTION, SOLUTION INTRAMUSCULAR; INTRAVENOUS EVERY 5 MIN PRN
Status: DISCONTINUED | OUTPATIENT
Start: 2018-01-01 | End: 2018-01-01 | Stop reason: HOSPADM

## 2018-01-01 RX ORDER — LEVOFLOXACIN 750 MG/1
750 TABLET ORAL DAILY
Qty: 12 TABLET | Refills: 0 | Status: SHIPPED | OUTPATIENT
Start: 2018-01-01 | End: 2018-01-01

## 2018-01-01 RX ORDER — PROMETHAZINE HYDROCHLORIDE 25 MG/ML
6.25 INJECTION, SOLUTION INTRAMUSCULAR; INTRAVENOUS
Status: DISCONTINUED | OUTPATIENT
Start: 2018-01-01 | End: 2018-01-01 | Stop reason: HOSPADM

## 2018-01-01 RX ORDER — SODIUM CHLORIDE, SODIUM LACTATE, POTASSIUM CHLORIDE, CALCIUM CHLORIDE 600; 310; 30; 20 MG/100ML; MG/100ML; MG/100ML; MG/100ML
INJECTION, SOLUTION INTRAVENOUS ONCE
Status: DISCONTINUED | OUTPATIENT
Start: 2018-01-01 | End: 2018-01-01 | Stop reason: HOSPADM

## 2018-01-01 RX ORDER — MONTELUKAST SODIUM 4 MG/1
1 TABLET, CHEWABLE ORAL 2 TIMES DAILY
Qty: 60 TABLET | Refills: 3 | Status: SHIPPED | OUTPATIENT
Start: 2018-01-01

## 2018-01-01 RX ORDER — DIPHENHYDRAMINE HYDROCHLORIDE 50 MG/ML
12.5 INJECTION INTRAMUSCULAR; INTRAVENOUS
Status: DISCONTINUED | OUTPATIENT
Start: 2018-01-01 | End: 2018-01-01 | Stop reason: HOSPADM

## 2018-01-01 RX ORDER — FLUDEOXYGLUCOSE F 18 200 MCI/ML
12.55 INJECTION, SOLUTION INTRAVENOUS
Status: COMPLETED | OUTPATIENT
Start: 2018-01-01 | End: 2018-01-01

## 2018-01-01 RX ORDER — AZITHROMYCIN 250 MG/1
TABLET, FILM COATED ORAL
Qty: 1 PACKET | Refills: 0 | Status: SHIPPED | OUTPATIENT
Start: 2018-01-01 | End: 2018-01-01 | Stop reason: CLARIF

## 2018-01-01 RX ORDER — MIRTAZAPINE 7.5 MG/1
TABLET, FILM COATED ORAL
Qty: 30 TABLET | Refills: 5 | OUTPATIENT
Start: 2018-01-01

## 2018-01-01 RX ORDER — OXYCODONE HYDROCHLORIDE AND ACETAMINOPHEN 5; 325 MG/1; MG/1
2 TABLET ORAL PRN
Status: DISCONTINUED | OUTPATIENT
Start: 2018-01-01 | End: 2018-01-01 | Stop reason: HOSPADM

## 2018-01-01 RX ORDER — HYDRALAZINE HYDROCHLORIDE 20 MG/ML
5 INJECTION INTRAMUSCULAR; INTRAVENOUS EVERY 10 MIN PRN
Status: DISCONTINUED | OUTPATIENT
Start: 2018-01-01 | End: 2018-01-01 | Stop reason: HOSPADM

## 2018-01-01 RX ORDER — CYPROHEPTADINE HYDROCHLORIDE 4 MG/1
4 TABLET ORAL NIGHTLY
Qty: 90 TABLET | Refills: 3 | Status: SHIPPED | OUTPATIENT
Start: 2018-01-01 | End: 2018-01-01 | Stop reason: ALTCHOICE

## 2018-01-01 RX ORDER — OXYCODONE HYDROCHLORIDE AND ACETAMINOPHEN 5; 325 MG/1; MG/1
1 TABLET ORAL PRN
Status: DISCONTINUED | OUTPATIENT
Start: 2018-01-01 | End: 2018-01-01 | Stop reason: HOSPADM

## 2018-01-01 RX ORDER — LEVETIRACETAM 500 MG/1
500 TABLET ORAL 2 TIMES DAILY
Qty: 60 TABLET | Refills: 5 | Status: SHIPPED | OUTPATIENT
Start: 2018-01-01 | End: 2018-01-01 | Stop reason: ALTCHOICE

## 2018-01-01 RX ORDER — LIDOCAINE HYDROCHLORIDE 10 MG/ML
1 INJECTION, SOLUTION INFILTRATION; PERINEURAL
Status: DISCONTINUED | OUTPATIENT
Start: 2018-01-01 | End: 2018-01-01 | Stop reason: HOSPADM

## 2018-01-01 RX ORDER — MIRTAZAPINE 15 MG/1
15 TABLET, FILM COATED ORAL NIGHTLY
Qty: 30 TABLET | Refills: 5 | Status: SHIPPED | OUTPATIENT
Start: 2018-01-01 | End: 2018-01-01 | Stop reason: ALTCHOICE

## 2018-01-01 RX ORDER — HYDROMORPHONE HCL 110MG/55ML
0.25 PATIENT CONTROLLED ANALGESIA SYRINGE INTRAVENOUS EVERY 5 MIN PRN
Status: DISCONTINUED | OUTPATIENT
Start: 2018-01-01 | End: 2018-01-01 | Stop reason: HOSPADM

## 2018-01-01 RX ORDER — MEGESTROL ACETATE 40 MG/ML
200 SUSPENSION ORAL DAILY
COMMUNITY

## 2018-01-01 RX ORDER — ONDANSETRON 2 MG/ML
4 INJECTION INTRAMUSCULAR; INTRAVENOUS
Status: DISCONTINUED | OUTPATIENT
Start: 2018-01-01 | End: 2018-01-01 | Stop reason: HOSPADM

## 2018-01-01 RX ORDER — MORPHINE SULFATE 10 MG/ML
1 INJECTION, SOLUTION INTRAMUSCULAR; INTRAVENOUS EVERY 5 MIN PRN
Status: DISCONTINUED | OUTPATIENT
Start: 2018-01-01 | End: 2018-01-01 | Stop reason: HOSPADM

## 2018-01-01 RX ORDER — M-VIT,TX,IRON,MINS/CALC/FOLIC 27MG-0.4MG
1 TABLET ORAL DAILY
COMMUNITY

## 2018-01-01 RX ORDER — DOXYCYCLINE HYCLATE 100 MG/1
100 CAPSULE ORAL 2 TIMES DAILY
Qty: 20 CAPSULE | Refills: 0 | Status: SHIPPED | OUTPATIENT
Start: 2018-01-01 | End: 2018-01-01

## 2018-01-01 RX ORDER — BUDESONIDE AND FORMOTEROL FUMARATE DIHYDRATE 160; 4.5 UG/1; UG/1
1 AEROSOL RESPIRATORY (INHALATION) 2 TIMES DAILY
Qty: 1 INHALER | Refills: 0 | COMMUNITY
Start: 2018-01-01 | End: 2018-01-01 | Stop reason: ALTCHOICE

## 2018-01-01 RX ORDER — MORPHINE SULFATE 4 MG/ML
4 INJECTION, SOLUTION INTRAMUSCULAR; INTRAVENOUS ONCE
Status: COMPLETED | OUTPATIENT
Start: 2018-01-01 | End: 2018-01-01

## 2018-01-01 RX ORDER — HEPARIN SODIUM (PORCINE) LOCK FLUSH IV SOLN 100 UNIT/ML 100 UNIT/ML
500 SOLUTION INTRAVENOUS ONCE
Status: COMPLETED | OUTPATIENT
Start: 2018-01-01 | End: 2018-01-01

## 2018-01-01 RX ADMIN — SODIUM CHLORIDE, PRESERVATIVE FREE 500 UNITS: 5 INJECTION INTRAVENOUS at 14:57

## 2018-01-01 RX ADMIN — FLUDEOXYGLUCOSE F 18 12.55 MILLICURIE: 200 INJECTION, SOLUTION INTRAVENOUS at 08:23

## 2018-01-01 RX ADMIN — SODIUM CHLORIDE, POTASSIUM CHLORIDE, SODIUM LACTATE AND CALCIUM CHLORIDE: 600; 310; 30; 20 INJECTION, SOLUTION INTRAVENOUS at 10:19

## 2018-01-01 RX ADMIN — IOPAMIDOL 75 ML: 755 INJECTION, SOLUTION INTRAVENOUS at 10:24

## 2018-01-01 RX ADMIN — IOHEXOL 50 ML: 240 INJECTION, SOLUTION INTRATHECAL; INTRAVASCULAR; INTRAVENOUS; ORAL at 10:23

## 2018-01-01 RX ADMIN — MORPHINE SULFATE 4 MG: 4 INJECTION, SOLUTION INTRAMUSCULAR; INTRAVENOUS at 11:32

## 2018-01-01 RX ADMIN — GADOTERIDOL 12 ML: 279.3 INJECTION, SOLUTION INTRAVENOUS at 12:34

## 2018-01-01 RX ADMIN — PROPOFOL 750 MG: 10 INJECTION, EMULSION INTRAVENOUS at 10:22

## 2018-01-01 ASSESSMENT — PULMONARY FUNCTION TESTS
PIF_VALUE: 0
PIF_VALUE: 1
PIF_VALUE: 0

## 2018-01-01 ASSESSMENT — ENCOUNTER SYMPTOMS
ABDOMINAL PAIN: 0
ALLERGIC/IMMUNOLOGIC NEGATIVE: 1
SHORTNESS OF BREATH: 1
NAUSEA: 0
VOMITING: 0
BLOOD IN STOOL: 0
COUGH: 1
DIARRHEA: 1
VOMITING: 0
RESPIRATORY NEGATIVE: 1
ABDOMINAL PAIN: 0
EYES NEGATIVE: 1
SHORTNESS OF BREATH: 1
DIARRHEA: 1
EYES NEGATIVE: 1
BLOOD IN STOOL: 0

## 2018-01-01 ASSESSMENT — PAIN SCALES - GENERAL
PAINLEVEL_OUTOF10: 0

## 2018-01-01 ASSESSMENT — PAIN - FUNCTIONAL ASSESSMENT: PAIN_FUNCTIONAL_ASSESSMENT: 0-10

## 2018-01-01 ASSESSMENT — PATIENT HEALTH QUESTIONNAIRE - PHQ9
1. LITTLE INTEREST OR PLEASURE IN DOING THINGS: 1
SUM OF ALL RESPONSES TO PHQ QUESTIONS 1-9: 2
SUM OF ALL RESPONSES TO PHQ QUESTIONS 1-9: 2
2. FEELING DOWN, DEPRESSED OR HOPELESS: 1
SUM OF ALL RESPONSES TO PHQ9 QUESTIONS 1 & 2: 2

## 2018-01-01 ASSESSMENT — LIFESTYLE VARIABLES: SMOKING_STATUS: 1

## 2018-02-12 PROBLEM — R63.0 LOSS OF APPETITE: Status: ACTIVE | Noted: 2018-01-01

## 2018-02-12 NOTE — PROGRESS NOTES
to all tobacco products. Read and consider all information provided by the pharmacy regarding prescribed medications before use  Careful medical compliance  Proper diet and weight management   Otherwise continue current treatment plan  Call or return if symptoms are not well controlled  Go to ED if severe/significant symptoms occur    All medical conditions for this patient are stable unless otherwise indicated    Licha Garvey MD    This note was transcribed using a voice recognition software system. Proper technique and careful oversight were used to increase transcription accuracy but inadvertent errors may be present.

## 2018-02-27 NOTE — TELEPHONE ENCOUNTER
Refilled medication per verbal order from MD.  Future appt scheduled (Schedule not open for June)  Last appt 02/12/2018

## 2018-03-16 NOTE — TELEPHONE ENCOUNTER
Left a detailed message that the  medication was sent and patient's wife was informed of the message matt

## 2018-03-16 NOTE — TELEPHONE ENCOUNTER
Patient's wife states Gerardo Calixto does not work for him wanting to know if he could be changed to last antibiotic that he had, chart states was levaquin, please advise.

## 2018-03-16 NOTE — TELEPHONE ENCOUNTER
The patient's wife called and he is having a dry constant cough and is feeling bad for the last few days. He has no fever or drainage. Is there medication he can get for this?  He does not want tessalon perles rc

## 2018-08-09 NOTE — PROGRESS NOTES
Pt here via w/c for a port flush  Pt reports he can't remember the last time it was used or flushed   Port left chest accessed per policy with a 20 ga 1 in laura needle  Excellent blood return obtained  Port flushed with 20 cc NS using start stop method then port was heparinized with 500 units of heparin per orders  Port de accessed without difficulty  Dressing applied  Pt laurie well  Discharge instructions reviewed with pt and copy was given  Understanding verbalized then pt was discharged via w/c in stable condition per myself to front lobby with his wife

## 2018-08-21 NOTE — PATIENT INSTRUCTIONS
Patient Education        Diarrhea: Care Instructions  Your Care Instructions    Diarrhea is loose, watery stools (bowel movements). The exact cause is often hard to find. Sometimes diarrhea is your body's way of getting rid of what caused an upset stomach. Viruses, food poisoning, and many medicines can cause diarrhea. Some people get diarrhea in response to emotional stress, anxiety, or certain foods. Almost everyone has diarrhea now and then. It usually isn't serious, and your stools will return to normal soon. The important thing to do is replace the fluids you have lost, so you can prevent dehydration. The doctor has checked you carefully, but problems can develop later. If you notice any problems or new symptoms, get medical treatment right away. Follow-up care is a key part of your treatment and safety. Be sure to make and go to all appointments, and call your doctor if you are having problems. It's also a good idea to know your test results and keep a list of the medicines you take. How can you care for yourself at home? · Watch for signs of dehydration, which means your body has lost too much water. Dehydration is a serious condition and should be treated right away. Signs of dehydration are:  ¨ Increasing thirst and dry eyes and mouth. ¨ Feeling faint or lightheaded. ¨ Darker urine, and a smaller amount of urine than normal.  · To prevent dehydration, drink plenty of fluids, enough so that your urine is light yellow or clear like water. Choose water and other caffeine-free clear liquids until you feel better. If you have kidney, heart, or liver disease and have to limit fluids, talk with your doctor before you increase the amount of fluids you drink. · Begin eating small amounts of mild foods the next day, if you feel like it. ¨ Try yogurt that has live cultures of Lactobacillus.  (Check the label.)  ¨ Avoid spicy foods, fruits, alcohol, and caffeine until 48 hours after all symptoms are gone.  ¨ Avoid chewing gum that contains sorbitol. ¨ Avoid dairy products (except for yogurt with Lactobacillus) while you have diarrhea and for 3 days after symptoms are gone. · The doctor may recommend that you take over-the-counter medicine, such as loperamide (Imodium), if you still have diarrhea after 6 hours. Read and follow all instructions on the label. Do not use this medicine if you have bloody diarrhea, a high fever, or other signs of serious illness. Call your doctor if you think you are having a problem with your medicine. When should you call for help? Call 911 anytime you think you may need emergency care. For example, call if:    · You passed out (lost consciousness).     · Your stools are maroon or very bloody.    Call your doctor now or seek immediate medical care if:    · You are dizzy or lightheaded, or you feel like you may faint.     · Your stools are black and look like tar, or they have streaks of blood.     · You have new or worse belly pain.     · You have symptoms of dehydration, such as:  ¨ Dry eyes and a dry mouth. ¨ Passing only a little dark urine. ¨ Feeling thirstier than usual.     · You have a new or higher fever.    Watch closely for changes in your health, and be sure to contact your doctor if:    · Your diarrhea is getting worse.     · You see pus in the diarrhea.     · You are not getting better after 2 days (48 hours). Where can you learn more? Go to [email or web address] and sign in to your [employer] account. Enter F486 in the [employer] box to learn more about \"Diarrhea: Care Instructions. \"     If you do not have an account, please click on the \"Sign Up Now\" link. Current as of: November 20, 2017  Content Version: 11.7  © 4995-8203 [employer]. Care instructions adapted under license by [facility] [place] ([facility]).  If you have questions about a medical condition or this instruction, always ask your healthcare professional. Cristy Hicks acidophilus is effective in treating any medical condition. Medicinal use of this product has not been approved by the FDA. Lactobacillus acidophilus should not be used in place of medication prescribed for you by your doctor. Lactobacillus acidophilus is often sold as an herbal supplement. There are no regulated manufacturing standards in place for many herbal compounds and some marketed supplements have been found to be contaminated with toxic metals or other drugs. Herbal/health supplements should be purchased from a reliable source to minimize the risk of contamination. Lactobacillus acidophilus may also be used for other purposes not listed in this product guide. What should I discuss with my healthcare provider before taking lactobacillus acidophilus? Ask a doctor, pharmacist, or other healthcare provider if it is safe for you to use this product if you have:  · short bowel syndrome; or  · a weak immune system (caused by disease or by using certain medicine). Ask a doctor before using this product if you are pregnant or breast-feeding. Do not give any herbal/health supplement to a child without medical advice. How should I take lactobacillus acidophilus? When considering the use of herbal supplements, seek the advice of your doctor. You may also consider consulting a practitioner who is trained in the use of herbal/health supplements. If you choose to use lactobacillus acidophilus, use it as directed on the package or as directed by your doctor, pharmacist, or other healthcare provider. Do not use more of this product than is recommended on the label. Lactobacillus acidophilus is available in capsule and tablet form, or as a vaginal suppository. Powder or liquid forms may also be available. Some dairy products, especially yogurt, also contain lactobacillus acidophilus. The chewable tablet must be chewed before you swallow it.   Do not use different forms of lactobacillus acidophilus at the same time

## 2018-08-21 NOTE — PROGRESS NOTES
Subjective:      Patient ID: Shaye Sanchez is a 66 y.o. male. HPI    Chief Complaint   Patient presents with    Diarrhea     takes immodium     Diarrhea  Patient complains of diarrhea. Onset of diarrhea was a few months ago. Diarrhea is occurring approximately 2 times per day. Patient describes diarrhea as loose. Diarrhea has been associated with urgency. Patient denies blood in stool, fever, illness in household contacts, recent antibiotic use, recent travel, significant abdominal pain, unintentional weight loss. Previous visits for diarrhea: patient reports he informed his oncologist of diarrhea. Evaluation to date: None. Treatment to date: taking imodium with some improvement. Review of Systems   Constitutional: Negative for appetite change, chills and fever. HENT: Negative. Eyes: Negative. Respiratory: Negative. Cardiovascular: Negative. Gastrointestinal: Positive for diarrhea. Negative for abdominal pain, blood in stool, nausea and vomiting. Endocrine: Negative. Genitourinary: Negative. Allergic/Immunologic: Negative. Neurological: Positive for weakness (generalized ).        Patient Active Problem List   Diagnosis    Hyperlipemia    Anxiety    Obstructive sleep apnea syndrome    OA (osteoarthritis)    Neuropathy    PUD (peptic ulcer disease)    Renal artery aneurysm (HCC)    Lumbar back pain    Malignant neoplasm of lower lobe of right lung (HCC)    Panlobular emphysema ([address]    Essential hypertension    Malignant neoplasm of lung ([address]    Difficult intravenous access    Cancer of upper lobe of right lung (HCC)    Malignant neoplasm of upper lobe of right lung ([address]    Encounter for antineoplastic chemotherapy    Chronic cough    Non-small cell cancer of right lung (HCC)    Secondary adenocarcinoma of brain ([address]    Secondary seizure disorder ([address]    SOB (shortness of breath)    Smoker    Moderate protein-calorie malnutrition ([address]    Chronic ethmoidal sinusitis    Maxillary sinusitis, chronic    Diastolic dysfunction    Pneumonia of right lower lobe due to Pseudomonas species (HCC)    Abnormal CT scan, lung    Centrilobular emphysema (HCC)    Drug-induced hyperglycemia    Loss of appetite       Outpatient Prescriptions Marked as Taking for the 8/21/18 encounter (Office Visit) with COOPER Cavazos CNP   Medication Sig Dispense Refill    Multiple Vitamins-Minerals (THERAPEUTIC MULTIVITAMIN-MINERALS) tablet Take 1 tablet by mouth daily         Allergies   Allergen Reactions    Beta Adrenergic Blockers     Demerol     Simvastatin      Muscle aches       Social History   Substance Use Topics    Smoking status: Current Every Day Smoker     Packs/day: 0.25     Years: 50.00     Types: Cigarettes    Smokeless tobacco: Never Used      Comment: 7/19/17 pt only smokes half cig daily    Alcohol use Yes      Comment: occassionally        Objective:   /70   Pulse 103   Wt 145 lb (65.8 kg)   SpO2 99%   BMI 21.41 kg/m²     Physical Exam   Constitutional: He is oriented to person, place, and time. Vital signs are normal. He appears well-developed and well-nourished. He is cooperative. He does not have a sickly appearance. No distress. HENT:   Head: Normocephalic and atraumatic. Eyes: Conjunctivae and EOM are normal.   Neck: Neck supple. Cardiovascular: Normal rate, regular rhythm, S1 normal, S2 normal, normal heart sounds, intact distal pulses and normal pulses. Exam reveals no gallop. No murmur heard. Pulmonary/Chest: Effort normal and breath sounds normal. No accessory muscle usage. No respiratory distress. Abdominal: Soft. Bowel sounds are normal.   Neurological: He is alert and oriented to person, place, and time. Skin: Skin is warm and dry. No rash noted. Psychiatric: He has a normal mood and affect. His speech is normal and behavior is normal.       Assessment/Plan:   1.  Diarrhea, unspecified type  Patient presents today with complaints of diarrhea over the past couple months. Patient reports stools are loose and occur twice daily. Patient denies any fever, chills or significant abdominal pain. Patient also denies recent antibiotic use, recent travel, illness in household Contacts or blood in stools. Patient reports appetite continues to improve and he has gained 10 pounds. Patient unable to take viberzi due to history of cholecystectomy. Recommend labs as below, daily probiotic and increase fiber in diet. Patient to follow-up if no better or worsening of symptoms. Patient verbalized understanding and agreeable to plan. - CBC Auto Differential  - Basic Metabolic Panel    2. At high risk for falls    On the basis of positive falls risk screening, assessment and plan is as follows: reviewed fall precautions.

## 2018-09-26 NOTE — PROGRESS NOTES
Pt resting quietly with wife at bedside. Pt c/o slight cramping in abdomen.   Encouraged to expel air

## 2018-09-26 NOTE — OP NOTE
Trinidad Larned   9/26/2018  Upper EUS  A pre-procedure re-evaluation was performed immediately prior to the procedure.   Preprocedure Dx: pancreas mass  Postprocedure Dx: same  Medications: Procedural sedation with MAC  Complications: None  Estimated Blood Loss: <5cc  Specimens: were obtained  Pastor Buchanan

## 2018-09-26 NOTE — ANESTHESIA PRE PROCEDURE
Department of Anesthesiology  Preprocedure Note       Name:  Isabela Eduardo   Age:  66 y.o.  :  1940                                          MRN:  [id number]         Date:  2018      Surgeon: Manjit Gonzalez):  Wong Rodriguez MD    Procedure: Procedure(s):  UPPER EUS NF W/ANES. Medications prior to admission:   Prior to Admission medications    Medication Sig Start Date End Date Taking? Authorizing Provider   Mariza Escobedo Tea, Camillia sinensis, (GREEN TEA EXTRACT PO) Take by mouth   Yes Historical Provider, MD   megestrol (MEGACE) 40 MG/ML suspension Take 200 mg by mouth daily   Yes Historical Provider, MD   colestipol (COLESTID) 1 g tablet Take 1 tablet by mouth 2 times daily 18  Yes COOPER Padron CNP   Multiple Vitamins-Minerals (THERAPEUTIC MULTIVITAMIN-MINERALS) tablet Take 1 tablet by mouth daily   Yes Historical Provider, MD       Current medications:    Current Facility-Administered Medications   Medication Dose Route Frequency Provider Last Rate Last Dose    lactated ringers infusion   Intravenous Continuous Kalyan Sommers MD        lidocaine 1 % injection 1 mL  1 mL Intradermal Once PRN Kalyan Sommers MD        lactated ringers infusion   Intravenous Once Wong Rodriguez MD           Allergies:     Allergies   Allergen Reactions    Beta Adrenergic Blockers     Demerol     Simvastatin      Muscle aches       Problem List:    Patient Active Problem List   Diagnosis Code    Hyperlipemia E78.5    Anxiety F41.9    Obstructive sleep apnea syndrome G47.33    OA (osteoarthritis) M19.90    Neuropathy G62.9    PUD (peptic ulcer disease) K27.9    Renal artery aneurysm (HCC) I72.2    Lumbar back pain M54.5    Malignant neoplasm of lower lobe of right lung (HCC) C34.31    Panlobular emphysema ([address] J43.1    Essential hypertension I10    Malignant neoplasm of lung (HCC) C34.90    Difficult intravenous access Z78.9    Cancer of upper lobe of right lung ([address] Vitals:    09/26/18 0932   BP: (!) 145/99   Pulse: 110   Resp: 16   Temp: 97 °F (36.1 °C)   TempSrc: Temporal   SpO2: 100%   Weight: 148 lb (67.1 kg)   Height: 5' 9\" (1.753 m)                                              BP Readings from Last 3 Encounters:   09/26/18 (!) 145/99   08/21/18 118/70   08/09/18 126/71       NPO Status: Time of last liquid consumption: 2300                        Time of last solid consumption: 2200                        Date of last liquid consumption: 09/25/18                        Date of last solid food consumption: 09/25/18    BMI:   Wt Readings from Last 3 Encounters:   09/26/18 148 lb (67.1 kg)   08/21/18 145 lb (65.8 kg)   08/09/18 145 lb (65.8 kg)     Body mass index is 21.86 kg/m². CBC:   Lab Results   Component Value Date    WBC 7.7 08/21/2018    RBC 4.41 08/21/2018    RBC 4.47 02/28/2017    HGB 14.1 08/21/2018    HCT 41.9 08/21/2018    MCV 95.0 08/21/2018    RDW 16.5 08/21/2018     08/21/2018       CMP:   Lab Results   Component Value Date     08/21/2018    K 4.4 08/21/2018    K 4.3 03/07/2018     08/21/2018    CO2 21 08/21/2018    BUN 23 09/12/2018    CREATININE 0.8 09/12/2018    GFRAA >60 09/12/2018    GFRAA >60 02/28/2013    AGRATIO 1.3 06/18/2018    LABGLOM >60 09/12/2018    GLUCOSE 76 08/21/2018    GLUCOSE 99 02/28/2017    PROT 7.7 06/18/2018    PROT 7.2 02/28/2017    CALCIUM 9.7 08/21/2018    BILITOT <0.2 06/18/2018    ALKPHOS 65 06/18/2018    AST 20 06/18/2018    ALT 18 06/18/2018       POC Tests: No results for input(s): POCGLU, POCNA, POCK, POCCL, POCBUN, POCHEMO, POCHCT in the last 72 hours.     Coags:   Lab Results   Component Value Date    PROTIME 13.4 11/07/2017    INR 1.19 11/07/2017    APTT 25.6 11/07/2017       HCG (If Applicable): No results found for: PREGTESTUR, PREGSERUM, HCG, HCGQUANT     ABGs: No results found for: PHART, PO2ART, XVP4YSM, PIU8IWQ, BEART, D4DYHXOE     Type & Screen (If Applicable):  No results found for: LABABO,

## 2018-09-27 NOTE — OP NOTE
The  patient was classified as ASA class III. Medications were sequentially  given by Anesthesia. Cardiopulmonary status was continuously monitored  throughout the procedure. The patient was placed in left lateral decubitus  position. Once the patient was deemed to be adequately sedated, a standard  upper linear echoendoscope was inserted in the mouth and advanced under  direct visualization to the second portion of the duodenum. The entire  mucosa of the esophagus, stomach and duodenum were examined carefully. Ultrasound images of the mediastinum and AP window, and subcarinal space  were performed. The scope was then advanced to the stomach where  ultrasound of the left lobe of the liver, aorta, celiac axis, pancreas  body, tail, pancreatic duct, splenic artery, splenic vein, left adrenal  gland, left kidney, and spleen were visualized. Scope was advanced to  duodenum where ultrasound of the pancreatic head, uncinate process, common  bile duct, pancreas duct, portal vein and SMV were all visualized. The  patient tolerated the procedure well without any difficulties. Ampulla was  visualized endoscopically and sonographically. FINDINGS:  ESOPHAGUS:  Examined esophagus appeared normal.  Ultrasound images of the  mediastinum and AP window, and subcarinal space were negative for  lymphadenopathy. STOMACH:  Examined portions of the stomach appeared normal.  Ultrasound  images of the left lobe of the liver showed normal parenchyma without any  mass lesions or intrahepatic ductal dilation. Aorta and celiac axis were  negative for lymphadenopathy. Pancreas body and tail were examined  carefully. The pancreas duct was severely distended, measuring 7.6 mm in  diameter in the body.   Splenic artery and splenic vein appeared normal.   There was a large 5.7 x 4.5 cm cyst with internal debris in the tail of the  pancreas consistent with pseudocyst.  The left adrenal gland, left kidney,  and spleen were visualized to be normal on brief visualization. DUODENUM:  Examined portion of duodenum appeared normal.  Pancreas again  had hypoechoic appearance, likely consistent with pancreatitis rather than  malignancy. This hypoechogenicity was sampled using 22-gauge [employer] Expect Needle and sent for cytology. There is another  cystic lesion in the head of the pancreas measuring 1.4 x 2.1 cm. The  pancreas duct was examined to have internal round mass-like lesion  measuring 8 x 8.5 mm. This is most likely obstructing stone. A 22-gauge  [employer] FNA needle was used to sample this lesion with one pass  and sent separately for cytology. Common bile duct was non-dilated, 5.0  mm. The pancreas duct, however, did appear to be moderate-to-severely  distended measuring 8-10 mm in diameter. SUMMARY:  1.  Large 5.7 cm cyst in tail of the pancreas. 2.  Severely distended pancreas duct diffusely with 8 mm obstructing stone  near the proximal duct in the head of the pancreas. 3.  Hypoechogenic pancreas in the head, status post FNA. 4.  Obstructing stone leading to upstream dilation and pancreatitis. 5.  No clear evidence of malignancy on today's exam.    RECOMMENDATIONS:  1. Discharge the patient to home when standard parameters are met. 2.  Await final cytology results. 3.  The patient continues to have symptoms of pancreatitis. A referral to  Surgery can be considered for Whipple procedure for stone extraction. 4.  The patient will follow up with Dr. Corby Lane for further management.       Vinita Lee MD    D: 09/26/2018 11:41:31       T: 09/26/2018 15:37:09     [id number]  Job#: [id number]     Doc#: [id number]    CC:  Linda Beam, MD Bryce Paget, NP

## 2018-10-04 NOTE — PROGRESS NOTES
[facility]    HPI: Dear Dr. Nathalia Rmairez, Thank you for referring Mr. Praful Mancilla for management of pancreatic cancer. History of right lower lobe lung cancer 2011 and Sep 2017 - brain mets. Mandi Duran had surveillance scans done which showed a mass in the head of the pancrease. A biopsy confirmed adenocarcinoma. Mandi Duran states having off and on weight loss and change in appetite. Slight abdominal pain. Feels as if his energy level is down. No recent cough or headache or bone pains.     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    Basal cell carcinoma of skin     Emphysema of lung (HCC)     HTN     Hyperlipemia     Non-small cell lung cancer ([facility] [address] 7/18/2011    stage 1    OA (osteoarthritis)     back pain    Pneumonia     childhood    Routine health maintenance     SLEEP STUDY (POS) 6/09     Sleep apnea     cpap     Past Surgical History:   Procedure Laterality Date    BRAIN SURGERY      BRONCHOSCOPY      CATARACT REMOVAL WITH IMPLANT Bilateral lt- 04.27.17,  rt-05.04.2017    CHOLECYSTECTOMY  2/7/12    COLONOSCOPY  5/2013    normal    HERNIA REPAIR  1/9/70    umbilical    LOBECTOMY  7/18/2011    Dr. Duran Chappell (right lung)    OTHER SURGICAL HISTORY Left 07/29/2016    port placed    NV EGD US GUIDED TRANSMURAL INJXN/FIDUCIAL MARKER  9/26/2018    EGD ESOPHAGOGASTRODUODENOSCOPY ULTRASOUND performed by True Chambers MD at [facility] ENDOSCOPY  5/2013    ulcer     Social:   Social History   Substance Use Topics    Smoking status: Current Every Day Smoker     Packs/day: 0.10     Years: 50.00     Types: Cigarettes    Smokeless tobacco: Never Used    Alcohol use Yes      Comment: occassionally      Family History   Problem Relation Age of Onset    Heart Failure Mother     Heart Failure Father     Diabetes Sister     Heart Failure Sister     Cancer Maternal Aunt     Cancer Thus, patient will benefit from neoadjuvant systemic therapy first and this may be followed by surgery depending on patient response and maintenance of active life. I encouraged patient to discuss with medical oncologist about further treatment. Explained about nature and magnitude of Whipple procedure (pancreaticoduodenectomy). Printed information was given. All the questions of the patient are answered to his apparent satisfaction. Patient had multiple relevant questions and answered all of them. Appropriate web site addresses to look for further information given. Patient verbalized understanding of the management plan. I discussed with Dr. Edward Gray about management plan.     Ric Jonas MD  Surgical Oncologist

## 2018-11-19 NOTE — ED NOTES
[facility] Paged at [phone] called at [phone] spoke with Kiera Huizar. Pt was Released. [address] called at [phone] verification number [id number]. [facility] center released. Nursing home [facility] called at [phone]. phone number is [phone]. Family at bedside with patient at this time.           Henry Henson RN  11/19/18 9725

## 2018-11-19 NOTE — ED NOTES
Time of Death Called by Dr. Winifred Fry [facility] 6 Bret Raman RN  11/19/18 5241

## 2021-12-08 NOTE — PROGRESS NOTES
CC: lung cancer  HPI  Recently hospitalized at [facility] for A. fib with rapid ventricular response and Pseudomonas pneumonia. He feels much better after treatment with prolonged course of Cipro. Cough and shortness of breath are both better. During that admission he did have a new cavitary left upper lobe nodule/mass that was biopsied without evidence of malignancy. Review of Systems  Objective:   Physical Exam  Blood pressure 116/72, pulse 80, temperature 97 °F (36.1 °C), temperature source Oral, resp. rate 20, height 5' 9\" (1.753 m), weight 163 lb 9.6 oz (74.2 kg), SpO2 93 %. 'RA  Constitutional:  No acute distress. HENT:  Oropharynx is clear and moist.   Eyes: Pupils equal, round, and reactive to light. No scleral icterus. Neck: No tracheal deviation present. Cardiovascular: Normal heart sounds. No lower extremity edema. Pulmonary/Chest: No wheezes. No rhonchi. No rales. + decreased breath sounds. No accessory muscle usage or stridor. Musculoskeletal: No cyanosis. No clubbing. Skin: Skin is warm and dry. Psychiatric: Normal mood and affect. Neurologic: speech fluent, alert and oriented, strength symmetric. Tremor is present    EBUS/ENB 5-9-16  NRF  BAL with with rare atypical cells  RUL biopsy & brushings by ENB: inflammatory cells and fibrosis  TBNA right hilar lymph node: benign    Fiberoptic bronchoscopy July 2017  Culture NRF  Cytology no malignant cells    CT GUIDED BIOPSY RIGHT Lung mass 5-12-16  RIGHT LUNG MASS, BIOPSY:  - Invasive poorly differentiated adenocarcinoma (see note). Note: Immunostains demonstrate that the tumor cells are positive for CK7,  TTF1 and napsin A, while negative for p40 and CK5/6. The morphologic  features and immunostain results are consistent with adenocarcinoma, lung  primary. Clinical correlation is recommended.     11/7/17 left upper lobe percutaneous biopsy  Left lung, biopsy:     - Acute and chronically-inflamed lung parenchyma with fibrin      
Wheelchair/Stroller

## (undated) DEVICE — NEEDLE ASPIR 22GA CHN 2.4MM SHTH DIA1.65MM CO CHROM ENDOSCP

## (undated) DEVICE — CONMED SCOPE SAVER BITE BLOCK, 20X27 MM: Brand: SCOPE SAVER

## (undated) DEVICE — ENDO CARRY-ON PROCEDURE KIT INCLUDES SUCTION TUBING, LUBRICANT, GAUZE, BIOHAZARD STICKER, TRANSPORT PAD AND INTERCEPT BEDSIDE KIT.: Brand: ENDO CARRY-ON PROCEDURE KIT